# Patient Record
Sex: MALE | Race: WHITE | NOT HISPANIC OR LATINO | Employment: UNEMPLOYED | URBAN - METROPOLITAN AREA
[De-identification: names, ages, dates, MRNs, and addresses within clinical notes are randomized per-mention and may not be internally consistent; named-entity substitution may affect disease eponyms.]

---

## 2022-05-23 ENCOUNTER — HOSPITAL ENCOUNTER (INPATIENT)
Facility: HOSPITAL | Age: 57
LOS: 2 days | Discharge: HOME | End: 2022-05-27
Attending: EMERGENCY MEDICINE | Admitting: HOSPITALIST

## 2022-05-23 ENCOUNTER — APPOINTMENT (OUTPATIENT)
Dept: GENERAL RADIOLOGY | Facility: HOSPITAL | Age: 57
End: 2022-05-23

## 2022-05-23 DIAGNOSIS — L03.113 CELLULITIS OF RIGHT FOREARM: ICD-10-CM

## 2022-05-23 DIAGNOSIS — M71.121 SEPTIC BURSITIS OF ELBOW, RIGHT: Primary | ICD-10-CM

## 2022-05-23 PROBLEM — M25.421 EFFUSION OF RIGHT ELBOW: Status: ACTIVE | Noted: 2022-05-23

## 2022-05-23 LAB
ALBUMIN SERPL-MCNC: 3.4 G/DL (ref 3.5–5.2)
ALBUMIN/GLOB SERPL: 1.1 G/DL
ALP SERPL-CCNC: 77 U/L (ref 39–117)
ALT SERPL W P-5'-P-CCNC: 13 U/L (ref 1–41)
ANION GAP SERPL CALCULATED.3IONS-SCNC: 9 MMOL/L (ref 5–15)
AST SERPL-CCNC: 13 U/L (ref 1–40)
BASOPHILS # BLD AUTO: 0.08 10*3/MM3 (ref 0–0.2)
BASOPHILS NFR BLD AUTO: 0.7 % (ref 0–1.5)
BILIRUB SERPL-MCNC: 0.4 MG/DL (ref 0–1.2)
BUN SERPL-MCNC: 9 MG/DL (ref 6–20)
BUN/CREAT SERPL: 19.6 (ref 7–25)
CALCIUM SPEC-SCNC: 9.1 MG/DL (ref 8.6–10.5)
CHLORIDE SERPL-SCNC: 108 MMOL/L (ref 98–107)
CK SERPL-CCNC: 126 U/L (ref 20–200)
CO2 SERPL-SCNC: 23 MMOL/L (ref 22–29)
CREAT SERPL-MCNC: 0.46 MG/DL (ref 0.76–1.27)
CRP SERPL-MCNC: 2.79 MG/DL (ref 0–0.5)
D-LACTATE SERPL-SCNC: 0.8 MMOL/L (ref 0.5–2)
DEPRECATED RDW RBC AUTO: 43.8 FL (ref 37–54)
EGFRCR SERPLBLD CKD-EPI 2021: 122 ML/MIN/1.73
EOSINOPHIL # BLD AUTO: 0.04 10*3/MM3 (ref 0–0.4)
EOSINOPHIL NFR BLD AUTO: 0.3 % (ref 0.3–6.2)
ERYTHROCYTE [DISTWIDTH] IN BLOOD BY AUTOMATED COUNT: 13 % (ref 12.3–15.4)
ERYTHROCYTE [SEDIMENTATION RATE] IN BLOOD: 29 MM/HR (ref 0–20)
GLOBULIN UR ELPH-MCNC: 3.2 GM/DL
GLUCOSE SERPL-MCNC: 99 MG/DL (ref 65–99)
HCT VFR BLD AUTO: 43 % (ref 37.5–51)
HGB BLD-MCNC: 15.2 G/DL (ref 13–17.7)
HOLD SPECIMEN: NORMAL
HOLD SPECIMEN: NORMAL
IMM GRANULOCYTES # BLD AUTO: 0.08 10*3/MM3 (ref 0–0.05)
IMM GRANULOCYTES NFR BLD AUTO: 0.7 % (ref 0–0.5)
LYMPHOCYTES # BLD AUTO: 2.71 10*3/MM3 (ref 0.7–3.1)
LYMPHOCYTES NFR BLD AUTO: 23.6 % (ref 19.6–45.3)
MCH RBC QN AUTO: 33.3 PG (ref 26.6–33)
MCHC RBC AUTO-ENTMCNC: 35.3 G/DL (ref 31.5–35.7)
MCV RBC AUTO: 94.3 FL (ref 79–97)
MONOCYTES # BLD AUTO: 1.7 10*3/MM3 (ref 0.1–0.9)
MONOCYTES NFR BLD AUTO: 14.8 % (ref 5–12)
NEUTROPHILS NFR BLD AUTO: 59.9 % (ref 42.7–76)
NEUTROPHILS NFR BLD AUTO: 6.86 10*3/MM3 (ref 1.7–7)
NRBC BLD AUTO-RTO: 0 /100 WBC (ref 0–0.2)
PLATELET # BLD AUTO: 325 10*3/MM3 (ref 140–450)
PMV BLD AUTO: 10.4 FL (ref 6–12)
POTASSIUM SERPL-SCNC: 4.2 MMOL/L (ref 3.5–5.2)
PROT SERPL-MCNC: 6.6 G/DL (ref 6–8.5)
RBC # BLD AUTO: 4.56 10*6/MM3 (ref 4.14–5.8)
SARS-COV-2 RNA PNL SPEC NAA+PROBE: NOT DETECTED
SODIUM SERPL-SCNC: 140 MMOL/L (ref 136–145)
URATE SERPL-MCNC: 6.5 MG/DL (ref 3.4–7)
WBC NRBC COR # BLD: 11.47 10*3/MM3 (ref 3.4–10.8)
WHOLE BLOOD HOLD COAG: NORMAL
WHOLE BLOOD HOLD SPECIMEN: NORMAL

## 2022-05-23 PROCEDURE — 25010000002 VANCOMYCIN 10 G RECONSTITUTED SOLUTION: Performed by: EMERGENCY MEDICINE

## 2022-05-23 PROCEDURE — G0378 HOSPITAL OBSERVATION PER HR: HCPCS

## 2022-05-23 PROCEDURE — 73090 X-RAY EXAM OF FOREARM: CPT

## 2022-05-23 PROCEDURE — 25010000002 HYDROMORPHONE PER 4 MG: Performed by: EMERGENCY MEDICINE

## 2022-05-23 PROCEDURE — 80053 COMPREHEN METABOLIC PANEL: CPT | Performed by: EMERGENCY MEDICINE

## 2022-05-23 PROCEDURE — 85652 RBC SED RATE AUTOMATED: CPT | Performed by: EMERGENCY MEDICINE

## 2022-05-23 PROCEDURE — 86140 C-REACTIVE PROTEIN: CPT | Performed by: EMERGENCY MEDICINE

## 2022-05-23 PROCEDURE — 36415 COLL VENOUS BLD VENIPUNCTURE: CPT

## 2022-05-23 PROCEDURE — 83605 ASSAY OF LACTIC ACID: CPT | Performed by: EMERGENCY MEDICINE

## 2022-05-23 PROCEDURE — 25010000002 KETOROLAC TROMETHAMINE PER 15 MG: Performed by: EMERGENCY MEDICINE

## 2022-05-23 PROCEDURE — 87040 BLOOD CULTURE FOR BACTERIA: CPT | Performed by: EMERGENCY MEDICINE

## 2022-05-23 PROCEDURE — 84550 ASSAY OF BLOOD/URIC ACID: CPT | Performed by: HOSPITALIST

## 2022-05-23 PROCEDURE — 99284 EMERGENCY DEPT VISIT MOD MDM: CPT

## 2022-05-23 PROCEDURE — 82550 ASSAY OF CK (CPK): CPT | Performed by: EMERGENCY MEDICINE

## 2022-05-23 PROCEDURE — 25010000002 HYDROMORPHONE PER 4 MG: Performed by: HOSPITALIST

## 2022-05-23 PROCEDURE — 25010000002 MORPHINE PER 10 MG: Performed by: EMERGENCY MEDICINE

## 2022-05-23 PROCEDURE — 25010000002 METHYLPREDNISOLONE PER 125 MG: Performed by: EMERGENCY MEDICINE

## 2022-05-23 PROCEDURE — 85025 COMPLETE CBC W/AUTO DIFF WBC: CPT | Performed by: EMERGENCY MEDICINE

## 2022-05-23 PROCEDURE — 25010000002 CEFTRIAXONE PER 250 MG: Performed by: EMERGENCY MEDICINE

## 2022-05-23 PROCEDURE — 87635 SARS-COV-2 COVID-19 AMP PRB: CPT | Performed by: EMERGENCY MEDICINE

## 2022-05-23 RX ORDER — OMEGA-3 FATTY ACIDS/FISH OIL 300-1000MG
2 CAPSULE ORAL 4 TIMES DAILY PRN
COMMUNITY
End: 2022-05-27 | Stop reason: HOSPADM

## 2022-05-23 RX ORDER — NALOXONE HCL 0.4 MG/ML
0.4 VIAL (ML) INJECTION
Status: DISCONTINUED | OUTPATIENT
Start: 2022-05-23 | End: 2022-05-27 | Stop reason: HOSPADM

## 2022-05-23 RX ORDER — HYDROCODONE BITARTRATE AND ACETAMINOPHEN 7.5; 325 MG/1; MG/1
1 TABLET ORAL EVERY 4 HOURS PRN
Status: DISCONTINUED | OUTPATIENT
Start: 2022-05-23 | End: 2022-05-25

## 2022-05-23 RX ORDER — SODIUM CHLORIDE 0.9 % (FLUSH) 0.9 %
10 SYRINGE (ML) INJECTION EVERY 12 HOURS SCHEDULED
Status: DISCONTINUED | OUTPATIENT
Start: 2022-05-23 | End: 2022-05-27 | Stop reason: HOSPADM

## 2022-05-23 RX ORDER — HYDROMORPHONE HYDROCHLORIDE 1 MG/ML
0.5 INJECTION, SOLUTION INTRAMUSCULAR; INTRAVENOUS; SUBCUTANEOUS
Status: DISCONTINUED | OUTPATIENT
Start: 2022-05-23 | End: 2022-05-27 | Stop reason: HOSPADM

## 2022-05-23 RX ORDER — ACETAMINOPHEN 325 MG/1
650 TABLET ORAL EVERY 4 HOURS PRN
Status: DISCONTINUED | OUTPATIENT
Start: 2022-05-23 | End: 2022-05-27 | Stop reason: HOSPADM

## 2022-05-23 RX ORDER — KETOROLAC TROMETHAMINE 15 MG/ML
15 INJECTION, SOLUTION INTRAMUSCULAR; INTRAVENOUS ONCE
Status: COMPLETED | OUTPATIENT
Start: 2022-05-23 | End: 2022-05-23

## 2022-05-23 RX ORDER — MORPHINE SULFATE 2 MG/ML
4 INJECTION, SOLUTION INTRAMUSCULAR; INTRAVENOUS ONCE
Status: COMPLETED | OUTPATIENT
Start: 2022-05-23 | End: 2022-05-23

## 2022-05-23 RX ORDER — METHYLPREDNISOLONE SODIUM SUCCINATE 125 MG/2ML
125 INJECTION, POWDER, LYOPHILIZED, FOR SOLUTION INTRAMUSCULAR; INTRAVENOUS ONCE
Status: COMPLETED | OUTPATIENT
Start: 2022-05-23 | End: 2022-05-23

## 2022-05-23 RX ORDER — HYDROMORPHONE HYDROCHLORIDE 1 MG/ML
0.5 INJECTION, SOLUTION INTRAMUSCULAR; INTRAVENOUS; SUBCUTANEOUS ONCE
Status: COMPLETED | OUTPATIENT
Start: 2022-05-23 | End: 2022-05-23

## 2022-05-23 RX ORDER — SODIUM CHLORIDE 0.9 % (FLUSH) 0.9 %
10 SYRINGE (ML) INJECTION AS NEEDED
Status: DISCONTINUED | OUTPATIENT
Start: 2022-05-23 | End: 2022-05-27 | Stop reason: HOSPADM

## 2022-05-23 RX ADMIN — VANCOMYCIN HYDROCHLORIDE 1500 MG: 10 INJECTION, POWDER, LYOPHILIZED, FOR SOLUTION INTRAVENOUS at 22:36

## 2022-05-23 RX ADMIN — MORPHINE SULFATE 4 MG: 2 INJECTION, SOLUTION INTRAMUSCULAR; INTRAVENOUS at 17:04

## 2022-05-23 RX ADMIN — HYDROCODONE BITARTRATE AND ACETAMINOPHEN 1 TABLET: 7.5; 325 TABLET ORAL at 22:37

## 2022-05-23 RX ADMIN — KETOROLAC TROMETHAMINE 15 MG: 15 INJECTION, SOLUTION INTRAMUSCULAR; INTRAVENOUS at 18:19

## 2022-05-23 RX ADMIN — HYDROMORPHONE HYDROCHLORIDE 0.5 MG: 1 INJECTION, SOLUTION INTRAMUSCULAR; INTRAVENOUS; SUBCUTANEOUS at 23:53

## 2022-05-23 RX ADMIN — HYDROMORPHONE HYDROCHLORIDE 0.5 MG: 1 INJECTION, SOLUTION INTRAMUSCULAR; INTRAVENOUS; SUBCUTANEOUS at 20:44

## 2022-05-23 RX ADMIN — CEFTRIAXONE 1 G: 1 INJECTION, POWDER, FOR SOLUTION INTRAMUSCULAR; INTRAVENOUS at 17:04

## 2022-05-23 RX ADMIN — SODIUM CHLORIDE 500 ML: 9 INJECTION, SOLUTION INTRAVENOUS at 17:06

## 2022-05-23 RX ADMIN — METHYLPREDNISOLONE SODIUM SUCCINATE 125 MG: 125 INJECTION, POWDER, FOR SOLUTION INTRAMUSCULAR; INTRAVENOUS at 17:04

## 2022-05-23 RX ADMIN — Medication 10 ML: at 22:37

## 2022-05-24 LAB
ANION GAP SERPL CALCULATED.3IONS-SCNC: 8 MMOL/L (ref 5–15)
BUN SERPL-MCNC: 10 MG/DL (ref 6–20)
BUN/CREAT SERPL: 26.3 (ref 7–25)
CALCIUM SPEC-SCNC: 8.9 MG/DL (ref 8.6–10.5)
CHLORIDE SERPL-SCNC: 107 MMOL/L (ref 98–107)
CO2 SERPL-SCNC: 20 MMOL/L (ref 22–29)
CREAT SERPL-MCNC: 0.38 MG/DL (ref 0.76–1.27)
DEPRECATED RDW RBC AUTO: 42.8 FL (ref 37–54)
EGFRCR SERPLBLD CKD-EPI 2021: 129.2 ML/MIN/1.73
ERYTHROCYTE [DISTWIDTH] IN BLOOD BY AUTOMATED COUNT: 12 % (ref 12.3–15.4)
GLUCOSE SERPL-MCNC: 150 MG/DL (ref 65–99)
HCT VFR BLD AUTO: 42.5 % (ref 37.5–51)
HGB BLD-MCNC: 14.7 G/DL (ref 13–17.7)
MCH RBC QN AUTO: 33.4 PG (ref 26.6–33)
MCHC RBC AUTO-ENTMCNC: 34.6 G/DL (ref 31.5–35.7)
MCV RBC AUTO: 96.6 FL (ref 79–97)
PLATELET # BLD AUTO: 281 10*3/MM3 (ref 140–450)
PMV BLD AUTO: 9.9 FL (ref 6–12)
POTASSIUM SERPL-SCNC: 4.8 MMOL/L (ref 3.5–5.2)
RBC # BLD AUTO: 4.4 10*6/MM3 (ref 4.14–5.8)
SODIUM SERPL-SCNC: 135 MMOL/L (ref 136–145)
WBC NRBC COR # BLD: 8.23 10*3/MM3 (ref 3.4–10.8)

## 2022-05-24 PROCEDURE — G0378 HOSPITAL OBSERVATION PER HR: HCPCS

## 2022-05-24 PROCEDURE — 99204 OFFICE O/P NEW MOD 45 MIN: CPT | Performed by: STUDENT IN AN ORGANIZED HEALTH CARE EDUCATION/TRAINING PROGRAM

## 2022-05-24 PROCEDURE — 80048 BASIC METABOLIC PNL TOTAL CA: CPT | Performed by: HOSPITALIST

## 2022-05-24 PROCEDURE — 25010000002 VANCOMYCIN 10 G RECONSTITUTED SOLUTION: Performed by: NURSE PRACTITIONER

## 2022-05-24 PROCEDURE — 25010000002 HYDROMORPHONE PER 4 MG: Performed by: HOSPITALIST

## 2022-05-24 PROCEDURE — 85027 COMPLETE CBC AUTOMATED: CPT | Performed by: HOSPITALIST

## 2022-05-24 RX ADMIN — HYDROCODONE BITARTRATE AND ACETAMINOPHEN 1 TABLET: 7.5; 325 TABLET ORAL at 21:19

## 2022-05-24 RX ADMIN — HYDROCODONE BITARTRATE AND ACETAMINOPHEN 1 TABLET: 7.5; 325 TABLET ORAL at 17:05

## 2022-05-24 RX ADMIN — HYDROMORPHONE HYDROCHLORIDE 0.5 MG: 1 INJECTION, SOLUTION INTRAMUSCULAR; INTRAVENOUS; SUBCUTANEOUS at 02:47

## 2022-05-24 RX ADMIN — HYDROCODONE BITARTRATE AND ACETAMINOPHEN 1 TABLET: 7.5; 325 TABLET ORAL at 09:22

## 2022-05-24 RX ADMIN — VANCOMYCIN HYDROCHLORIDE 1250 MG: 10 INJECTION, POWDER, LYOPHILIZED, FOR SOLUTION INTRAVENOUS at 09:22

## 2022-05-24 RX ADMIN — VANCOMYCIN HYDROCHLORIDE 1250 MG: 10 INJECTION, POWDER, LYOPHILIZED, FOR SOLUTION INTRAVENOUS at 21:19

## 2022-05-24 RX ADMIN — HYDROMORPHONE HYDROCHLORIDE 0.5 MG: 1 INJECTION, SOLUTION INTRAMUSCULAR; INTRAVENOUS; SUBCUTANEOUS at 15:39

## 2022-05-24 RX ADMIN — Medication 10 ML: at 21:19

## 2022-05-24 RX ADMIN — Medication 10 ML: at 09:23

## 2022-05-24 RX ADMIN — HYDROMORPHONE HYDROCHLORIDE 0.5 MG: 1 INJECTION, SOLUTION INTRAMUSCULAR; INTRAVENOUS; SUBCUTANEOUS at 06:34

## 2022-05-24 RX ADMIN — HYDROMORPHONE HYDROCHLORIDE 0.5 MG: 1 INJECTION, SOLUTION INTRAMUSCULAR; INTRAVENOUS; SUBCUTANEOUS at 09:22

## 2022-05-25 ENCOUNTER — APPOINTMENT (OUTPATIENT)
Dept: MRI IMAGING | Facility: HOSPITAL | Age: 57
End: 2022-05-25

## 2022-05-25 LAB — VANCOMYCIN TROUGH SERPL-MCNC: 6.2 MCG/ML (ref 5–20)

## 2022-05-25 PROCEDURE — 99232 SBSQ HOSP IP/OBS MODERATE 35: CPT | Performed by: STUDENT IN AN ORGANIZED HEALTH CARE EDUCATION/TRAINING PROGRAM

## 2022-05-25 PROCEDURE — 80202 ASSAY OF VANCOMYCIN: CPT | Performed by: NURSE PRACTITIONER

## 2022-05-25 PROCEDURE — 25010000002 VANCOMYCIN 10 G RECONSTITUTED SOLUTION: Performed by: NURSE PRACTITIONER

## 2022-05-25 PROCEDURE — 25010000002 VANCOMYCIN 10 G RECONSTITUTED SOLUTION: Performed by: STUDENT IN AN ORGANIZED HEALTH CARE EDUCATION/TRAINING PROGRAM

## 2022-05-25 RX ORDER — HYDROCODONE BITARTRATE AND ACETAMINOPHEN 10; 325 MG/1; MG/1
1 TABLET ORAL EVERY 6 HOURS PRN
Status: DISCONTINUED | OUTPATIENT
Start: 2022-05-25 | End: 2022-05-25

## 2022-05-25 RX ORDER — HYDROCODONE BITARTRATE AND ACETAMINOPHEN 10; 325 MG/1; MG/1
1 TABLET ORAL EVERY 4 HOURS PRN
Status: DISCONTINUED | OUTPATIENT
Start: 2022-05-25 | End: 2022-05-27 | Stop reason: HOSPADM

## 2022-05-25 RX ADMIN — HYDROCODONE BITARTRATE AND ACETAMINOPHEN 1 TABLET: 7.5; 325 TABLET ORAL at 08:24

## 2022-05-25 RX ADMIN — HYDROCODONE BITARTRATE AND ACETAMINOPHEN 1 TABLET: 10; 325 TABLET ORAL at 18:16

## 2022-05-25 RX ADMIN — HYDROCODONE BITARTRATE AND ACETAMINOPHEN 1 TABLET: 7.5; 325 TABLET ORAL at 14:14

## 2022-05-25 RX ADMIN — HYDROCODONE BITARTRATE AND ACETAMINOPHEN 1 TABLET: 7.5; 325 TABLET ORAL at 04:25

## 2022-05-25 RX ADMIN — VANCOMYCIN HYDROCHLORIDE 1500 MG: 10 INJECTION, POWDER, LYOPHILIZED, FOR SOLUTION INTRAVENOUS at 22:33

## 2022-05-25 RX ADMIN — Medication 10 ML: at 08:24

## 2022-05-25 RX ADMIN — VANCOMYCIN HYDROCHLORIDE 1250 MG: 10 INJECTION, POWDER, LYOPHILIZED, FOR SOLUTION INTRAVENOUS at 11:46

## 2022-05-25 RX ADMIN — HYDROCODONE BITARTRATE AND ACETAMINOPHEN 1 TABLET: 10; 325 TABLET ORAL at 22:32

## 2022-05-25 RX ADMIN — Medication 10 ML: at 22:37

## 2022-05-26 ENCOUNTER — APPOINTMENT (OUTPATIENT)
Dept: MRI IMAGING | Facility: HOSPITAL | Age: 57
End: 2022-05-26

## 2022-05-26 LAB
ANION GAP SERPL CALCULATED.3IONS-SCNC: 10.3 MMOL/L (ref 5–15)
BUN SERPL-MCNC: 9 MG/DL (ref 6–20)
BUN/CREAT SERPL: 18 (ref 7–25)
CALCIUM SPEC-SCNC: 9.6 MG/DL (ref 8.6–10.5)
CHLORIDE SERPL-SCNC: 105 MMOL/L (ref 98–107)
CO2 SERPL-SCNC: 22.7 MMOL/L (ref 22–29)
CREAT SERPL-MCNC: 0.5 MG/DL (ref 0.76–1.27)
EGFRCR SERPLBLD CKD-EPI 2021: 119 ML/MIN/1.73
GLUCOSE SERPL-MCNC: 139 MG/DL (ref 65–99)
POTASSIUM SERPL-SCNC: 4 MMOL/L (ref 3.5–5.2)
SODIUM SERPL-SCNC: 138 MMOL/L (ref 136–145)

## 2022-05-26 PROCEDURE — 25010000002 LORAZEPAM PER 2 MG: Performed by: STUDENT IN AN ORGANIZED HEALTH CARE EDUCATION/TRAINING PROGRAM

## 2022-05-26 PROCEDURE — 80048 BASIC METABOLIC PNL TOTAL CA: CPT | Performed by: NURSE PRACTITIONER

## 2022-05-26 PROCEDURE — 25010000002 HYDROMORPHONE PER 4 MG: Performed by: HOSPITALIST

## 2022-05-26 PROCEDURE — 73223 MRI JOINT UPR EXTR W/O&W/DYE: CPT

## 2022-05-26 PROCEDURE — 25010000002 VANCOMYCIN 10 G RECONSTITUTED SOLUTION: Performed by: STUDENT IN AN ORGANIZED HEALTH CARE EDUCATION/TRAINING PROGRAM

## 2022-05-26 PROCEDURE — 99232 SBSQ HOSP IP/OBS MODERATE 35: CPT | Performed by: STUDENT IN AN ORGANIZED HEALTH CARE EDUCATION/TRAINING PROGRAM

## 2022-05-26 PROCEDURE — 0 GADOBENATE DIMEGLUMINE 529 MG/ML SOLUTION: Performed by: STUDENT IN AN ORGANIZED HEALTH CARE EDUCATION/TRAINING PROGRAM

## 2022-05-26 PROCEDURE — A9577 INJ MULTIHANCE: HCPCS | Performed by: STUDENT IN AN ORGANIZED HEALTH CARE EDUCATION/TRAINING PROGRAM

## 2022-05-26 RX ORDER — LORAZEPAM 2 MG/ML
0.5 INJECTION INTRAMUSCULAR ONCE
Status: COMPLETED | OUTPATIENT
Start: 2022-05-26 | End: 2022-05-26

## 2022-05-26 RX ADMIN — VANCOMYCIN HYDROCHLORIDE 1500 MG: 10 INJECTION, POWDER, LYOPHILIZED, FOR SOLUTION INTRAVENOUS at 22:21

## 2022-05-26 RX ADMIN — Medication 10 ML: at 09:08

## 2022-05-26 RX ADMIN — HYDROMORPHONE HYDROCHLORIDE 0.5 MG: 1 INJECTION, SOLUTION INTRAMUSCULAR; INTRAVENOUS; SUBCUTANEOUS at 09:07

## 2022-05-26 RX ADMIN — LORAZEPAM 0.5 MG: 2 INJECTION INTRAMUSCULAR; INTRAVENOUS at 09:19

## 2022-05-26 RX ADMIN — VANCOMYCIN HYDROCHLORIDE 1500 MG: 10 INJECTION, POWDER, LYOPHILIZED, FOR SOLUTION INTRAVENOUS at 11:21

## 2022-05-26 RX ADMIN — HYDROCODONE BITARTRATE AND ACETAMINOPHEN 1 TABLET: 10; 325 TABLET ORAL at 09:07

## 2022-05-26 RX ADMIN — GADOBENATE DIMEGLUMINE 16 ML: 529 INJECTION, SOLUTION INTRAVENOUS at 10:26

## 2022-05-26 RX ADMIN — HYDROCODONE BITARTRATE AND ACETAMINOPHEN 1 TABLET: 10; 325 TABLET ORAL at 22:22

## 2022-05-27 VITALS
SYSTOLIC BLOOD PRESSURE: 162 MMHG | HEIGHT: 69 IN | DIASTOLIC BLOOD PRESSURE: 90 MMHG | TEMPERATURE: 97.1 F | WEIGHT: 172 LBS | BODY MASS INDEX: 25.48 KG/M2 | HEART RATE: 56 BPM | OXYGEN SATURATION: 94 % | RESPIRATION RATE: 18 BRPM

## 2022-05-27 RX ORDER — HYDROCODONE BITARTRATE AND ACETAMINOPHEN 10; 325 MG/1; MG/1
1 TABLET ORAL EVERY 6 HOURS PRN
Qty: 12 TABLET | Refills: 0 | Status: SHIPPED | OUTPATIENT
Start: 2022-05-27 | End: 2022-05-30

## 2022-05-27 RX ORDER — SULFAMETHOXAZOLE AND TRIMETHOPRIM 800; 160 MG/1; MG/1
1 TABLET ORAL 2 TIMES DAILY
Qty: 20 TABLET | Refills: 0 | Status: SHIPPED | OUTPATIENT
Start: 2022-05-27 | End: 2022-06-06

## 2022-05-27 RX ADMIN — HYDROCODONE BITARTRATE AND ACETAMINOPHEN 1 TABLET: 10; 325 TABLET ORAL at 08:49

## 2022-05-28 LAB
BACTERIA SPEC AEROBE CULT: NORMAL
BACTERIA SPEC AEROBE CULT: NORMAL

## 2022-08-16 ENCOUNTER — HOSPITAL ENCOUNTER (OUTPATIENT)
Facility: HOSPITAL | Age: 57
Setting detail: OBSERVATION
Discharge: HOME | End: 2022-08-19
Attending: EMERGENCY MEDICINE | Admitting: EMERGENCY MEDICINE

## 2022-08-16 ENCOUNTER — APPOINTMENT (OUTPATIENT)
Dept: CT IMAGING | Facility: HOSPITAL | Age: 57
End: 2022-08-16

## 2022-08-16 DIAGNOSIS — K52.9 ENTERITIS: Primary | ICD-10-CM

## 2022-08-16 DIAGNOSIS — D72.829 LEUKOCYTOSIS, UNSPECIFIED TYPE: ICD-10-CM

## 2022-08-16 LAB
ALBUMIN SERPL-MCNC: 4 G/DL (ref 3.5–5.2)
ALBUMIN/GLOB SERPL: 1.4 G/DL
ALP SERPL-CCNC: 82 U/L (ref 39–117)
ALT SERPL W P-5'-P-CCNC: 15 U/L (ref 1–41)
ANION GAP SERPL CALCULATED.3IONS-SCNC: 10.2 MMOL/L (ref 5–15)
AST SERPL-CCNC: 18 U/L (ref 1–40)
BASOPHILS # BLD AUTO: 0.04 10*3/MM3 (ref 0–0.2)
BASOPHILS NFR BLD AUTO: 0.3 % (ref 0–1.5)
BILIRUB SERPL-MCNC: 0.8 MG/DL (ref 0–1.2)
BILIRUB UR QL STRIP: NEGATIVE
BUN SERPL-MCNC: 12 MG/DL (ref 6–20)
BUN/CREAT SERPL: 14.6 (ref 7–25)
CALCIUM SPEC-SCNC: 9.5 MG/DL (ref 8.6–10.5)
CHLORIDE SERPL-SCNC: 98 MMOL/L (ref 98–107)
CLARITY UR: CLEAR
CO2 SERPL-SCNC: 21.8 MMOL/L (ref 22–29)
COLOR UR: ABNORMAL
CREAT SERPL-MCNC: 0.82 MG/DL (ref 0.76–1.27)
DEPRECATED RDW RBC AUTO: 44.2 FL (ref 37–54)
EGFRCR SERPLBLD CKD-EPI 2021: 101.8 ML/MIN/1.73
EOSINOPHIL # BLD AUTO: 0.04 10*3/MM3 (ref 0–0.4)
EOSINOPHIL NFR BLD AUTO: 0.3 % (ref 0.3–6.2)
ERYTHROCYTE [DISTWIDTH] IN BLOOD BY AUTOMATED COUNT: 12.8 % (ref 12.3–15.4)
GLOBULIN UR ELPH-MCNC: 2.9 GM/DL
GLUCOSE SERPL-MCNC: 104 MG/DL (ref 65–99)
GLUCOSE UR STRIP-MCNC: NEGATIVE MG/DL
HCT VFR BLD AUTO: 50.2 % (ref 37.5–51)
HGB BLD-MCNC: 18 G/DL (ref 13–17.7)
HGB UR QL STRIP.AUTO: NEGATIVE
HOLD SPECIMEN: NORMAL
HOLD SPECIMEN: NORMAL
IMM GRANULOCYTES # BLD AUTO: 0.15 10*3/MM3 (ref 0–0.05)
IMM GRANULOCYTES NFR BLD AUTO: 1.1 % (ref 0–0.5)
KETONES UR QL STRIP: ABNORMAL
LEUKOCYTE ESTERASE UR QL STRIP.AUTO: NEGATIVE
LIPASE SERPL-CCNC: 30 U/L (ref 13–60)
LYMPHOCYTES # BLD AUTO: 2.18 10*3/MM3 (ref 0.7–3.1)
LYMPHOCYTES NFR BLD AUTO: 15.5 % (ref 19.6–45.3)
MCH RBC QN AUTO: 34.1 PG (ref 26.6–33)
MCHC RBC AUTO-ENTMCNC: 35.9 G/DL (ref 31.5–35.7)
MCV RBC AUTO: 95.1 FL (ref 79–97)
MONOCYTES # BLD AUTO: 1.84 10*3/MM3 (ref 0.1–0.9)
MONOCYTES NFR BLD AUTO: 13 % (ref 5–12)
NEUTROPHILS NFR BLD AUTO: 69.8 % (ref 42.7–76)
NEUTROPHILS NFR BLD AUTO: 9.86 10*3/MM3 (ref 1.7–7)
NITRITE UR QL STRIP: NEGATIVE
NRBC BLD AUTO-RTO: 0 /100 WBC (ref 0–0.2)
PH UR STRIP.AUTO: 5.5 [PH] (ref 5–8)
PLATELET # BLD AUTO: 349 10*3/MM3 (ref 140–450)
PMV BLD AUTO: 10.1 FL (ref 6–12)
POTASSIUM SERPL-SCNC: 4.4 MMOL/L (ref 3.5–5.2)
PROT SERPL-MCNC: 6.9 G/DL (ref 6–8.5)
PROT UR QL STRIP: ABNORMAL
RBC # BLD AUTO: 5.28 10*6/MM3 (ref 4.14–5.8)
SARS-COV-2 RNA RESP QL NAA+PROBE: NOT DETECTED
SODIUM SERPL-SCNC: 130 MMOL/L (ref 136–145)
SP GR UR STRIP: >=1.03 (ref 1–1.03)
UROBILINOGEN UR QL STRIP: ABNORMAL
WBC NRBC COR # BLD: 14.11 10*3/MM3 (ref 3.4–10.8)
WHOLE BLOOD HOLD COAG: NORMAL
WHOLE BLOOD HOLD SPECIMEN: NORMAL

## 2022-08-16 PROCEDURE — 25010000002 IOPAMIDOL 61 % SOLUTION: Performed by: EMERGENCY MEDICINE

## 2022-08-16 PROCEDURE — 25010000002 MORPHINE PER 10 MG: Performed by: EMERGENCY MEDICINE

## 2022-08-16 PROCEDURE — 80053 COMPREHEN METABOLIC PANEL: CPT | Performed by: EMERGENCY MEDICINE

## 2022-08-16 PROCEDURE — G0378 HOSPITAL OBSERVATION PER HR: HCPCS

## 2022-08-16 PROCEDURE — 82746 ASSAY OF FOLIC ACID SERUM: CPT | Performed by: NURSE PRACTITIONER

## 2022-08-16 PROCEDURE — 74177 CT ABD & PELVIS W/CONTRAST: CPT

## 2022-08-16 PROCEDURE — 93005 ELECTROCARDIOGRAM TRACING: CPT | Performed by: EMERGENCY MEDICINE

## 2022-08-16 PROCEDURE — 63710000001 ONDANSETRON PER 8 MG: Performed by: STUDENT IN AN ORGANIZED HEALTH CARE EDUCATION/TRAINING PROGRAM

## 2022-08-16 PROCEDURE — 96375 TX/PRO/DX INJ NEW DRUG ADDON: CPT

## 2022-08-16 PROCEDURE — 25010000002 ONDANSETRON PER 1 MG: Performed by: EMERGENCY MEDICINE

## 2022-08-16 PROCEDURE — 85025 COMPLETE CBC W/AUTO DIFF WBC: CPT | Performed by: EMERGENCY MEDICINE

## 2022-08-16 PROCEDURE — 82607 VITAMIN B-12: CPT | Performed by: NURSE PRACTITIONER

## 2022-08-16 PROCEDURE — 84484 ASSAY OF TROPONIN QUANT: CPT | Performed by: EMERGENCY MEDICINE

## 2022-08-16 PROCEDURE — C9803 HOPD COVID-19 SPEC COLLECT: HCPCS

## 2022-08-16 PROCEDURE — U0003 INFECTIOUS AGENT DETECTION BY NUCLEIC ACID (DNA OR RNA); SEVERE ACUTE RESPIRATORY SYNDROME CORONAVIRUS 2 (SARS-COV-2) (CORONAVIRUS DISEASE [COVID-19]), AMPLIFIED PROBE TECHNIQUE, MAKING USE OF HIGH THROUGHPUT TECHNOLOGIES AS DESCRIBED BY CMS-2020-01-R: HCPCS | Performed by: PHYSICIAN ASSISTANT

## 2022-08-16 PROCEDURE — 83605 ASSAY OF LACTIC ACID: CPT | Performed by: EMERGENCY MEDICINE

## 2022-08-16 PROCEDURE — 83690 ASSAY OF LIPASE: CPT | Performed by: EMERGENCY MEDICINE

## 2022-08-16 PROCEDURE — 81003 URINALYSIS AUTO W/O SCOPE: CPT | Performed by: EMERGENCY MEDICINE

## 2022-08-16 PROCEDURE — 93010 ELECTROCARDIOGRAM REPORT: CPT | Performed by: INTERNAL MEDICINE

## 2022-08-16 PROCEDURE — 25010000002 HYDROMORPHONE PER 4 MG: Performed by: EMERGENCY MEDICINE

## 2022-08-16 PROCEDURE — 99285 EMERGENCY DEPT VISIT HI MDM: CPT

## 2022-08-16 RX ORDER — ACETAMINOPHEN 325 MG/1
650 TABLET ORAL EVERY 4 HOURS PRN
Status: DISCONTINUED | OUTPATIENT
Start: 2022-08-16 | End: 2022-08-19 | Stop reason: HOSPADM

## 2022-08-16 RX ORDER — ONDANSETRON 2 MG/ML
4 INJECTION INTRAMUSCULAR; INTRAVENOUS ONCE
Status: COMPLETED | OUTPATIENT
Start: 2022-08-16 | End: 2022-08-16

## 2022-08-16 RX ORDER — SODIUM CHLORIDE 0.9 % (FLUSH) 0.9 %
10 SYRINGE (ML) INJECTION AS NEEDED
Status: DISCONTINUED | OUTPATIENT
Start: 2022-08-16 | End: 2022-08-19 | Stop reason: HOSPADM

## 2022-08-16 RX ORDER — MORPHINE SULFATE 2 MG/ML
4 INJECTION, SOLUTION INTRAMUSCULAR; INTRAVENOUS ONCE
Status: COMPLETED | OUTPATIENT
Start: 2022-08-16 | End: 2022-08-16

## 2022-08-16 RX ORDER — SODIUM CHLORIDE 0.9 % (FLUSH) 0.9 %
10 SYRINGE (ML) INJECTION EVERY 12 HOURS SCHEDULED
Status: DISCONTINUED | OUTPATIENT
Start: 2022-08-16 | End: 2022-08-19 | Stop reason: HOSPADM

## 2022-08-16 RX ORDER — NITROGLYCERIN 0.4 MG/1
0.4 TABLET SUBLINGUAL
Status: DISCONTINUED | OUTPATIENT
Start: 2022-08-16 | End: 2022-08-19 | Stop reason: HOSPADM

## 2022-08-16 RX ORDER — CHOLECALCIFEROL (VITAMIN D3) 125 MCG
5 CAPSULE ORAL NIGHTLY PRN
Status: DISCONTINUED | OUTPATIENT
Start: 2022-08-16 | End: 2022-08-19 | Stop reason: HOSPADM

## 2022-08-16 RX ORDER — HYDROMORPHONE HYDROCHLORIDE 1 MG/ML
0.5 INJECTION, SOLUTION INTRAMUSCULAR; INTRAVENOUS; SUBCUTANEOUS
Status: DISCONTINUED | OUTPATIENT
Start: 2022-08-16 | End: 2022-08-17

## 2022-08-16 RX ORDER — ONDANSETRON 4 MG/1
4 TABLET, FILM COATED ORAL EVERY 6 HOURS PRN
Status: DISCONTINUED | OUTPATIENT
Start: 2022-08-16 | End: 2022-08-19 | Stop reason: HOSPADM

## 2022-08-16 RX ORDER — SODIUM CHLORIDE 9 MG/ML
75 INJECTION, SOLUTION INTRAVENOUS CONTINUOUS
Status: DISCONTINUED | OUTPATIENT
Start: 2022-08-17 | End: 2022-08-19

## 2022-08-16 RX ORDER — ONDANSETRON 2 MG/ML
4 INJECTION INTRAMUSCULAR; INTRAVENOUS EVERY 6 HOURS PRN
Status: DISCONTINUED | OUTPATIENT
Start: 2022-08-16 | End: 2022-08-19 | Stop reason: HOSPADM

## 2022-08-16 RX ADMIN — HYDROMORPHONE HYDROCHLORIDE 0.5 MG: 1 INJECTION, SOLUTION INTRAMUSCULAR; INTRAVENOUS; SUBCUTANEOUS at 23:43

## 2022-08-16 RX ADMIN — ONDANSETRON 4 MG: 2 INJECTION INTRAMUSCULAR; INTRAVENOUS at 13:06

## 2022-08-16 RX ADMIN — SODIUM CHLORIDE 175 ML/HR: 9 INJECTION, SOLUTION INTRAVENOUS at 23:39

## 2022-08-16 RX ADMIN — METOPROLOL TARTRATE 5 MG: 1 INJECTION, SOLUTION INTRAVENOUS at 23:40

## 2022-08-16 RX ADMIN — ONDANSETRON HYDROCHLORIDE 4 MG: 4 TABLET, FILM COATED ORAL at 19:30

## 2022-08-16 RX ADMIN — SODIUM CHLORIDE 1000 ML: 9 INJECTION, SOLUTION INTRAVENOUS at 13:06

## 2022-08-16 RX ADMIN — IOPAMIDOL 85 ML: 612 INJECTION, SOLUTION INTRAVENOUS at 12:57

## 2022-08-16 RX ADMIN — Medication 10 ML: at 21:00

## 2022-08-16 RX ADMIN — MORPHINE SULFATE 4 MG: 2 INJECTION, SOLUTION INTRAMUSCULAR; INTRAVENOUS at 13:06

## 2022-08-16 RX ADMIN — SODIUM CHLORIDE 1000 ML: 9 INJECTION, SOLUTION INTRAVENOUS at 14:43

## 2022-08-16 NOTE — ED PROVIDER NOTES
EMERGENCY DEPARTMENT ENCOUNTER    Room Number:  28/28  Date of encounter:  8/16/2022  PCP: Provider, No Known  Historian: Patient      I used full protective equipment while examining this patient.  This includes face mask, gloves and protective eyewear.  I washed my hands before entering the room and immediately upon leaving the room      HPI:  Chief Complaint: Abdominal pain, vomiting, diarrhea  A complete HPI/ROS/PMH/PSH/SH/FH are unobtainable due to: Nothing    Context: Robert Hume is a 57 y.o. male who presents to the ED c/o 4-day history of sudden onset, constant abdominal pain, vomiting, diarrhea.  Patient states he has not been able to keep any food or fluids down secondary to the vomiting.  He has had intermittent diarrhea.  He describes his abdominal pain as crampy, diffuse, moderate in nature.  He denies any precipitating or alleviating symptoms.  He denies any prior similar episodes.  He denies any ill contacts.  He denies any recent antibiotics or travel.  He takes no medications and denies any prior abdominal surgeries.    Review of Medical Records  I reviewed patient's last admission from 5/23/2022.  Patient seen for septic bursitis of the right elbow.    PAST MEDICAL HISTORY  Active Ambulatory Problems     Diagnosis Date Noted   • Septic bursitis of elbow, right 05/23/2022   • Effusion of right elbow 05/23/2022   • Cellulitis of right elbow 05/23/2022     Resolved Ambulatory Problems     Diagnosis Date Noted   • No Resolved Ambulatory Problems     No Additional Past Medical History         PAST SURGICAL HISTORY  No past surgical history on file.      FAMILY HISTORY  No family history on file.      SOCIAL HISTORY  Social History     Socioeconomic History   • Marital status: Single   Tobacco Use   • Smoking status: Current Every Day Smoker     Packs/day: 1.00     Types: Cigarettes         ALLERGIES  Penicillins        REVIEW OF SYSTEMS  All systems reviewed and negative except for those discussed in  HPI.       PHYSICAL EXAM    I have reviewed the triage vital signs and nursing notes.    ED Triage Vitals   Temp Heart Rate Resp BP SpO2   08/16/22 1126 08/16/22 1126 08/16/22 1126 08/16/22 1135 08/16/22 1126   97.3 °F (36.3 °C) (!) 123 16 (!) 155/105 96 %      Temp src Heart Rate Source Patient Position BP Location FiO2 (%)   08/16/22 1126 08/16/22 1126 -- -- --   Tympanic Monitor          Physical Exam  GENERAL: Alert, oriented, ill-appearing, not distressed  HENT: head atraumatic, no nuchal rigidity  EYES: no scleral icterus, EOMI  CV: regular rhythm, regular rate, no murmur  RESPIRATORY: normal effort, CTA  ABDOMEN: soft, moderate diffuse tenderness without guarding or rebound.  Normal bowel sounds  MUSCULOSKELETAL: no deformity, FROM, no calf swelling or tenderness  NEURO: alert, moves all extremities, follows commands  SKIN: warm, dry        LAB RESULTS  Recent Results (from the past 24 hour(s))   Green Top (Gel)    Collection Time: 08/16/22 11:42 AM   Result Value Ref Range    Extra Tube Hold for add-ons.    Lavender Top    Collection Time: 08/16/22 11:42 AM   Result Value Ref Range    Extra Tube hold for add-on    Gold Top - SST    Collection Time: 08/16/22 11:42 AM   Result Value Ref Range    Extra Tube Hold for add-ons.    Light Blue Top    Collection Time: 08/16/22 11:42 AM   Result Value Ref Range    Extra Tube Hold for add-ons.    Comprehensive Metabolic Panel    Collection Time: 08/16/22 11:42 AM    Specimen: Blood   Result Value Ref Range    Glucose 104 (H) 65 - 99 mg/dL    BUN 12 6 - 20 mg/dL    Creatinine 0.82 0.76 - 1.27 mg/dL    Sodium 130 (L) 136 - 145 mmol/L    Potassium 4.4 3.5 - 5.2 mmol/L    Chloride 98 98 - 107 mmol/L    CO2 21.8 (L) 22.0 - 29.0 mmol/L    Calcium 9.5 8.6 - 10.5 mg/dL    Total Protein 6.9 6.0 - 8.5 g/dL    Albumin 4.00 3.50 - 5.20 g/dL    ALT (SGPT) 15 1 - 41 U/L    AST (SGOT) 18 1 - 40 U/L    Alkaline Phosphatase 82 39 - 117 U/L    Total Bilirubin 0.8 0.0 - 1.2 mg/dL     Globulin 2.9 gm/dL    A/G Ratio 1.4 g/dL    BUN/Creatinine Ratio 14.6 7.0 - 25.0    Anion Gap 10.2 5.0 - 15.0 mmol/L    eGFR 101.8 >60.0 mL/min/1.73   Lipase    Collection Time: 08/16/22 11:42 AM    Specimen: Blood   Result Value Ref Range    Lipase 30 13 - 60 U/L   CBC Auto Differential    Collection Time: 08/16/22 11:42 AM    Specimen: Blood   Result Value Ref Range    WBC 14.11 (H) 3.40 - 10.80 10*3/mm3    RBC 5.28 4.14 - 5.80 10*6/mm3    Hemoglobin 18.0 (H) 13.0 - 17.7 g/dL    Hematocrit 50.2 37.5 - 51.0 %    MCV 95.1 79.0 - 97.0 fL    MCH 34.1 (H) 26.6 - 33.0 pg    MCHC 35.9 (H) 31.5 - 35.7 g/dL    RDW 12.8 12.3 - 15.4 %    RDW-SD 44.2 37.0 - 54.0 fl    MPV 10.1 6.0 - 12.0 fL    Platelets 349 140 - 450 10*3/mm3    Neutrophil % 69.8 42.7 - 76.0 %    Lymphocyte % 15.5 (L) 19.6 - 45.3 %    Monocyte % 13.0 (H) 5.0 - 12.0 %    Eosinophil % 0.3 0.3 - 6.2 %    Basophil % 0.3 0.0 - 1.5 %    Immature Grans % 1.1 (H) 0.0 - 0.5 %    Neutrophils, Absolute 9.86 (H) 1.70 - 7.00 10*3/mm3    Lymphocytes, Absolute 2.18 0.70 - 3.10 10*3/mm3    Monocytes, Absolute 1.84 (H) 0.10 - 0.90 10*3/mm3    Eosinophils, Absolute 0.04 0.00 - 0.40 10*3/mm3    Basophils, Absolute 0.04 0.00 - 0.20 10*3/mm3    Immature Grans, Absolute 0.15 (H) 0.00 - 0.05 10*3/mm3    nRBC 0.0 0.0 - 0.2 /100 WBC   Urinalysis With Microscopic If Indicated (No Culture) - Urine, Clean Catch    Collection Time: 08/16/22  2:41 PM    Specimen: Urine, Clean Catch   Result Value Ref Range    Color, UA Dark Yellow (A) Yellow, Straw    Appearance, UA Clear Clear    pH, UA 5.5 5.0 - 8.0    Specific Gravity, UA >=1.030 1.005 - 1.030    Glucose, UA Negative Negative    Ketones, UA 15 mg/dL (1+) (A) Negative    Bilirubin, UA Negative Negative    Blood, UA Negative Negative    Protein, UA Trace (A) Negative    Leuk Esterase, UA Negative Negative    Nitrite, UA Negative Negative    Urobilinogen, UA 1.0 E.U./dL 0.2 - 1.0 E.U./dL   COVID-19,[facility] IN-HOUSE CEPHEID/KERI NP SWAB IN  TRANSPORT MEDIA 8-12 HR TAT - Swab, Nasopharynx    Collection Time: 08/16/22  2:42 PM    Specimen: Nasopharynx; Swab   Result Value Ref Range    COVID19 Not Detected Not Detected - Ref. Range       Ordered the above labs and independently reviewed the results.        RADIOLOGY  CT Abdomen Pelvis With Contrast    Result Date: 8/16/2022  CT ABDOMEN AND PELVIS WITH IV CONTRAST  HISTORY: Nausea, vomiting, diarrhea.  TECHNIQUE: Radiation dose reduction techniques were utilized, including automated exposure control and exposure modulation based on body size. 3 mm images were obtained through the abdomen and pelvis after the administration of IV contrast.  COMPARISON: None  FINDINGS: Lower chest: Patchy ground-glass opacities in the lung bases, right greater than left favoring atelectasis and/or scarring, please correlate clinically and follow-up as indicated.  The heart size is within normal limits.  Liver: Focal fat infiltration. Mild lobular contour. Too small to characterize sub-5 mm hypodensity in the right hepatic lobe.  Biliary tract: Within normal limits.  Spleen: Calcified granulomas.  Pancreas: Within normal limits.  Adrenals: Within normal limits.  Kidneys:  Within normal limits.  Bowel:  The stomach is incompletely distended. There is a long segment of bowel wall thickening and mucosal hyperenhancement involving the mid to distal jejunum to the terminal ileum. The appendix is within normal limits. The colon is incompletely distended. No free air or obvious pneumatosis. Small volume ascites.  Vasculature:    Extensive atherosclerosis abdominal aorta and branch vessels. The study is not optimized for vascular evaluation however, normal enhancement is present centrally.  Lymph Nodes:  Within normal limits.                                                        Pelvic organs: The bladder is incompletely distended. Prostate in situ.  Abdominal/Pelvic Wall: Within normal limits.  Bones: Multilevel degenerative  changes thoracolumbar spine. Arthropathic changes hips, right greater than left.      . IMPRESSION: 1.  Moderate-to-severe enteritis extending from the mid jejunum to the terminal ileum (likely infectious or inflammatory) with small volume ascites. Recommend clinical correlation and attention on short-term follow-up after treatment. 2.   Extensive atherosclerosis thoracoabdominal aorta and branch vessels. 3.  Bibasilar airspace disease favoring atelectasis and/or scar which can be followed on subsequent surveillance imaging. 4.  Please see above for additional findings.  I discussed these findings with Dr. Adrienne Nguyen at 1:24 PM on 08/16/2022.    This report was finalized on 8/16/2022 2:24 PM by Dr. Juan R Monson M.D.        I ordered the above noted radiological studies. Reviewed by me and discussed with radiologist.  See dictation for official radiology interpretation.      MEDICATIONS GIVEN IN ER    Medications   sodium chloride 0.9 % flush 10 mL (has no administration in time range)   sodium chloride 0.9 % flush 10 mL (has no administration in time range)   sodium chloride 0.9 % bolus 1,000 mL (0 mL Intravenous Stopped 8/16/22 1441)   ondansetron (ZOFRAN) injection 4 mg (4 mg Intravenous Given 8/16/22 1306)   morphine injection 4 mg (4 mg Intravenous Given 8/16/22 1306)   iopamidol (ISOVUE-300) 61 % injection 100 mL (85 mL Intravenous Given 8/16/22 1257)   sodium chloride 0.9 % bolus 1,000 mL (1,000 mL Intravenous New Bag 8/16/22 1443)         PROGRESS, DATA ANALYSIS, CONSULTS, AND MEDICAL DECISION MAKING    All labs have been independently reviewed by me.  All radiology studies have been reviewed by me and discussed with radiologist dictating the report.   EKG's independently viewed and interpreted by me.  Discussion below represents my analysis of pertinent findings related to patient's condition, differential diagnosis, treatment plan and final disposition.    I have discussed case with Dr. Nguyen, emergency room  physician.  She has performed her own bedside examination and agrees with treatment plan.    ED Course as of 08/16/22 1553   Tue Aug 16, 2022   1223 Patient presents with 4-day history of vomiting, abdominal pain, diarrhea.  Differential diagnoses include but not limited to gastroenteritis, colitis, diverticulitis, pancreatitis. [EE]   1308 Creatinine: 0.82 [EE]   1308 Sodium(!): 130 [EE]   1308 Lipase: 30 [EE]   1339 WBC(!): 14.11 [EE]   1339 Hemoglobin(!): 18.0 [EE]   1541 Recheck of patient.  He has stable vitals.  He states he still feels ill.  Given patient's leukocytosis and abnormal CT scan, plan to admit patient for observation. [EE]   1552 I discussed case with Otilia, physician assistant in the observation unit.  She agrees to admit the patient. [EE]      ED Course User Index  [EE] Chapito Kruger PA       AS OF 15:53 EDT VITALS:    BP - 137/86  HR - 64  TEMP - 97.3 °F (36.3 °C) (Tympanic)  O2 SATS - 97%        DIAGNOSIS  Final diagnoses:   Enteritis   Leukocytosis, unspecified type         DISPOSITION  Admitted      Dictated utilizing Dragon dictation     Chapito Kruger PA  08/16/22 7432

## 2022-08-16 NOTE — ED PROVIDER NOTES
The G[facility] and I have discussed this patient's history, physical exam and treatment plan.  I provided a substantive portion of the care of this patient.  I have reviewed the documentation and personally had a face to face interaction with the patient and personally performed the physical exam, in its entirety.  I affirm the documentation and agree with the treatment and plan.  The following describes my personal findings.      The patient presents complaining of vomiting, diarrhea for 3 days, vomited 5-6 times in the past 24 hours and had diarrhea 5-10 times in the past 24 hours without blood or black color.  Patient denies fever, reports he has had intermittent waves of abdominal pain which he describes as cramping in nature.  Patient denies sick contacts or recent antibiotics.      Comprehensive Physical exam:  Patient is mildly ill-appearing, conversant, oriented awake, alert  HEENT: normocephalic, atraumatic  Neck: No JVD no goiter, no pain with ROM  Pulmonary: Nontachypneic, breath sounds heard well bilaterally  cardiovascular: Tachycardic, heart rate in the 60s  Abdomen: Soft, mild diffuse tenderness to palpation r, no guarding or rebound, positive bowel sounds  musculoskeletal: Good range of motion, pulse, sensation x4  Neuro/psychiatric:calm, appropriate, cooperative  Skin:warm, dry    Discussed with Dr. Aidan Monson who reports patient's enteritis is fairly extensive from the jejunum to the TI with right-sided abdominal ascites associated.  No obstruction or acute surgical abnormality.    Given patient's significant vomiting and diarrhea anticipate observation for further testing, treatment as needed with stool studies    Patient was wearing facemask when I entered the room and throughout our encounter. Full protective equipment was worn throughout this patient encounter including a face mask, eye protection and gloves. Hand hygiene was performed before donning protective equipment and after removal when  leaving the room.           Adrienne Nguyen MD  08/16/22 8011

## 2022-08-16 NOTE — ED TRIAGE NOTES
abd pain and vomit since Sunday    Patient was placed in face mask during first look triage.  Patient was wearing a face mask throughout encounter.  I wore personal protective equipment throughout the encounter.  Hand hygiene was performed before and after patient encounter.

## 2022-08-16 NOTE — CASE MANAGEMENT/SOCIAL WORK
Discharge Planning Assessment  [facility]     Patient Name: Robert Hume  MRN: [id number]  Today's Date: 8/16/2022    Admit Date: 8/16/2022     Discharge Needs Assessment     Row Name 08/16/22 1718       Living Environment    People in Home other (see comments)    Unique Family Situation Lives with friends , Antoine Currie [phone]    Current Living Arrangements home    Primary Care Provided by self    Provides Primary Care For no one    Family Caregiver if Needed none    Quality of Family Relationships unable to assess    Able to Return to Prior Arrangements yes       Resource/Environmental Concerns    Resource/Environmental Concerns none    Transportation Concerns none       Transition Planning    Patient/Family Anticipates Transition to home    Patient/Family Anticipated Services at Transition none    Transportation Anticipated family or friend will provide       Discharge Needs Assessment    Readmission Within the Last 30 Days no previous admission in last 30 days    Equipment Currently Used at Home none    Concerns to be Addressed denies needs/concerns at this time;no discharge needs identified    Anticipated Changes Related to Illness none    Equipment Needed After Discharge none    Provided Post Acute Provider List? N/A    Provided Post Acute Provider Quality & Resource List? N/A               Discharge Plan     Row Name 08/16/22 0323       Plan    Plan Pt intends to return home upon discharge    Patient/Family in Agreement with Plan yes    Provided Post Acute Provider List? N/A    Provided Post Acute Provider Quality & Resource List? N/A    Plan Comments Face sheet verified, role of CCP explained.  Pt is independent in ADL's and denies the need for any assistive devices. Pt states that he lives with friends. Denies any difficulty paying for medications. Advised pt that if any further needs arise, to contact Case Management              Continued Care and Services - Admitted Since 8/16/2022     Coordination has not been started for this encounter.          Demographic Summary    No documentation.                Functional Status    No documentation.                Psychosocial    No documentation.                Abuse/Neglect    No documentation.                Legal    No documentation.                Substance Abuse    No documentation.                Patient Forms    No documentation.                   Evelyn Llanos RN

## 2022-08-17 ENCOUNTER — APPOINTMENT (OUTPATIENT)
Dept: CARDIOLOGY | Facility: HOSPITAL | Age: 57
End: 2022-08-17

## 2022-08-17 PROBLEM — I48.91 ATRIAL FIBRILLATION: Status: ACTIVE | Noted: 2022-08-17

## 2022-08-17 PROBLEM — D72.829 LEUKOCYTOSIS: Status: ACTIVE | Noted: 2022-08-17

## 2022-08-17 PROBLEM — E87.1 HYPONATREMIA: Status: ACTIVE | Noted: 2022-08-17

## 2022-08-17 LAB
AMPHET+METHAMPHET UR QL: NEGATIVE
ANION GAP SERPL CALCULATED.3IONS-SCNC: 10 MMOL/L (ref 5–15)
AORTIC DIMENSIONLESS INDEX: 0.7 (DI)
ASCENDING AORTA: 3.4 CM
BARBITURATES UR QL SCN: NEGATIVE
BASOPHILS # BLD AUTO: 0.05 10*3/MM3 (ref 0–0.2)
BASOPHILS NFR BLD AUTO: 0.4 % (ref 0–1.5)
BENZODIAZ UR QL SCN: NEGATIVE
BH CV ECHO MEAS - ACS: 2.07 CM
BH CV ECHO MEAS - AO MAX PG: 5 MMHG
BH CV ECHO MEAS - AO MEAN PG: 2.7 MMHG
BH CV ECHO MEAS - AO ROOT DIAM: 3.6 CM
BH CV ECHO MEAS - AO V2 MAX: 111.8 CM/SEC
BH CV ECHO MEAS - AO V2 VTI: 18.3 CM
BH CV ECHO MEAS - AVA(I,D): 3.1 CM2
BH CV ECHO MEAS - CONTRAST EF 4CH: 50 CM2
BH CV ECHO MEAS - EDV(CUBED): 101.4 ML
BH CV ECHO MEAS - EDV(MOD-SP2): 120 ML
BH CV ECHO MEAS - EDV(MOD-SP4): 136 ML
BH CV ECHO MEAS - EF(MOD-BP): 38.1 %
BH CV ECHO MEAS - EF(MOD-SP2): 36.7 %
BH CV ECHO MEAS - EF(MOD-SP4): 43.4 %
BH CV ECHO MEAS - ESV(CUBED): 42.6 ML
BH CV ECHO MEAS - ESV(MOD-SP2): 76 ML
BH CV ECHO MEAS - ESV(MOD-SP4): 77 ML
BH CV ECHO MEAS - FS: 25.1 %
BH CV ECHO MEAS - IVS/LVPW: 1.25 CM
BH CV ECHO MEAS - IVSD: 1.29 CM
BH CV ECHO MEAS - LV MASS(C)D: 198.8 GRAMS
BH CV ECHO MEAS - LV MAX PG: 2.6 MMHG
BH CV ECHO MEAS - LV MEAN PG: 1.43 MMHG
BH CV ECHO MEAS - LV V1 MAX: 80.1 CM/SEC
BH CV ECHO MEAS - LV V1 VTI: 13.4 CM
BH CV ECHO MEAS - LVIDD: 4.7 CM
BH CV ECHO MEAS - LVIDS: 3.5 CM
BH CV ECHO MEAS - LVOT AREA: 4.3 CM2
BH CV ECHO MEAS - LVOT DIAM: 2.34 CM
BH CV ECHO MEAS - LVPWD: 1.03 CM
BH CV ECHO MEAS - MV DEC SLOPE: 568.8 CM/SEC2
BH CV ECHO MEAS - MV MAX PG: 3.3 MMHG
BH CV ECHO MEAS - MV MEAN PG: 0.86 MMHG
BH CV ECHO MEAS - MV P1/2T: 49.8 MSEC
BH CV ECHO MEAS - MV V2 VTI: 23.5 CM
BH CV ECHO MEAS - MVA(P1/2T): 4.4 CM2
BH CV ECHO MEAS - MVA(VTI): 2.46 CM2
BH CV ECHO MEAS - PA ACC TIME: 0.1 SEC
BH CV ECHO MEAS - PA PR(ACCEL): 36.2 MMHG
BH CV ECHO MEAS - PA V2 MAX: 98.5 CM/SEC
BH CV ECHO MEAS - QP/QS: 0.42
BH CV ECHO MEAS - RAP SYSTOLE: 3 MMHG
BH CV ECHO MEAS - RV MAX PG: 1.95 MMHG
BH CV ECHO MEAS - RV V1 MAX: 69.8 CM/SEC
BH CV ECHO MEAS - RV V1 VTI: 9.7 CM
BH CV ECHO MEAS - RVOT DIAM: 1.79 CM
BH CV ECHO MEAS - SV(LVOT): 57.8 ML
BH CV ECHO MEAS - SV(MOD-SP2): 44 ML
BH CV ECHO MEAS - SV(MOD-SP4): 59 ML
BH CV ECHO MEAS - SV(RVOT): 24.4 ML
BH CV ECHO MEAS - TAPSE (>1.6): 2 CM
BH CV VAS BP RIGHT ARM: NORMAL MMHG
BH CV XLRA - RV BASE: 3.7 CM
BH CV XLRA - RV LENGTH: 5.6 CM
BH CV XLRA - RV MID: 3.6 CM
BH CV XLRA - TDI S': 13.5 CM/SEC
BUN SERPL-MCNC: 8 MG/DL (ref 6–20)
BUN/CREAT SERPL: 15.7 (ref 7–25)
CALCIUM SPEC-SCNC: 8.4 MG/DL (ref 8.6–10.5)
CANNABINOIDS SERPL QL: NEGATIVE
CHLORIDE SERPL-SCNC: 102 MMOL/L (ref 98–107)
CO2 SERPL-SCNC: 22 MMOL/L (ref 22–29)
COCAINE UR QL: NEGATIVE
CREAT SERPL-MCNC: 0.51 MG/DL (ref 0.76–1.27)
D-LACTATE SERPL-SCNC: 0.8 MMOL/L (ref 0.5–2)
DEPRECATED RDW RBC AUTO: 44.8 FL (ref 37–54)
EGFRCR SERPLBLD CKD-EPI 2021: 117.5 ML/MIN/1.73
EOSINOPHIL # BLD AUTO: 0.1 10*3/MM3 (ref 0–0.4)
EOSINOPHIL NFR BLD AUTO: 0.8 % (ref 0.3–6.2)
ERYTHROCYTE [DISTWIDTH] IN BLOOD BY AUTOMATED COUNT: 12.9 % (ref 12.3–15.4)
GLUCOSE SERPL-MCNC: 96 MG/DL (ref 65–99)
HCT VFR BLD AUTO: 48.6 % (ref 37.5–51)
HGB BLD-MCNC: 17 G/DL (ref 13–17.7)
IMM GRANULOCYTES # BLD AUTO: 0.09 10*3/MM3 (ref 0–0.05)
IMM GRANULOCYTES NFR BLD AUTO: 0.7 % (ref 0–0.5)
LEFT ATRIUM VOLUME INDEX: 38.6 ML/M2
LYMPHOCYTES # BLD AUTO: 1.76 10*3/MM3 (ref 0.7–3.1)
LYMPHOCYTES NFR BLD AUTO: 13.8 % (ref 19.6–45.3)
MAGNESIUM SERPL-MCNC: 1.4 MG/DL (ref 1.6–2.6)
MAXIMAL PREDICTED HEART RATE: 162 BPM
MCH RBC QN AUTO: 33.8 PG (ref 26.6–33)
MCHC RBC AUTO-ENTMCNC: 35 G/DL (ref 31.5–35.7)
MCV RBC AUTO: 96.6 FL (ref 79–97)
METHADONE UR QL SCN: NEGATIVE
MONOCYTES # BLD AUTO: 1.57 10*3/MM3 (ref 0.1–0.9)
MONOCYTES NFR BLD AUTO: 12.3 % (ref 5–12)
NEUTROPHILS NFR BLD AUTO: 72 % (ref 42.7–76)
NEUTROPHILS NFR BLD AUTO: 9.16 10*3/MM3 (ref 1.7–7)
NRBC BLD AUTO-RTO: 0 /100 WBC (ref 0–0.2)
OPIATES UR QL: NEGATIVE
OXYCODONE UR QL SCN: POSITIVE
PHOSPHATE SERPL-MCNC: 2.8 MG/DL (ref 2.5–4.5)
PLATELET # BLD AUTO: 304 10*3/MM3 (ref 140–450)
PMV BLD AUTO: 9.9 FL (ref 6–12)
POTASSIUM SERPL-SCNC: 4.4 MMOL/L (ref 3.5–5.2)
QT INTERVAL: 292 MS
RBC # BLD AUTO: 5.03 10*6/MM3 (ref 4.14–5.8)
SINUS: 3.6 CM
SODIUM SERPL-SCNC: 134 MMOL/L (ref 136–145)
STJ: 3.4 CM
STRESS TARGET HR: 138 BPM
TROPONIN T SERPL-MCNC: <0.01 NG/ML (ref 0–0.03)
TSH SERPL DL<=0.05 MIU/L-ACNC: 2.25 UIU/ML (ref 0.27–4.2)
WBC NRBC COR # BLD: 12.73 10*3/MM3 (ref 3.4–10.8)

## 2022-08-17 PROCEDURE — 96375 TX/PRO/DX INJ NEW DRUG ADDON: CPT

## 2022-08-17 PROCEDURE — 25010000002 HYDROMORPHONE PER 4 MG: Performed by: EMERGENCY MEDICINE

## 2022-08-17 PROCEDURE — 96376 TX/PRO/DX INJ SAME DRUG ADON: CPT

## 2022-08-17 PROCEDURE — G0378 HOSPITAL OBSERVATION PER HR: HCPCS

## 2022-08-17 PROCEDURE — 80307 DRUG TEST PRSMV CHEM ANLYZR: CPT | Performed by: HOSPITALIST

## 2022-08-17 PROCEDURE — 84100 ASSAY OF PHOSPHORUS: CPT | Performed by: HOSPITALIST

## 2022-08-17 PROCEDURE — 25010000002 PERFLUTREN (DEFINITY) 8.476 MG IN SODIUM CHLORIDE (PF) 0.9 % 10 ML INJECTION: Performed by: INTERNAL MEDICINE

## 2022-08-17 PROCEDURE — 83735 ASSAY OF MAGNESIUM: CPT | Performed by: HOSPITALIST

## 2022-08-17 PROCEDURE — 25010000002 MAGNESIUM SULFATE 2 GM/50ML SOLUTION: Performed by: HOSPITALIST

## 2022-08-17 PROCEDURE — 96365 THER/PROPH/DIAG IV INF INIT: CPT

## 2022-08-17 PROCEDURE — 96372 THER/PROPH/DIAG INJ SC/IM: CPT

## 2022-08-17 PROCEDURE — 96361 HYDRATE IV INFUSION ADD-ON: CPT

## 2022-08-17 PROCEDURE — 84443 ASSAY THYROID STIM HORMONE: CPT | Performed by: INTERNAL MEDICINE

## 2022-08-17 PROCEDURE — 96366 THER/PROPH/DIAG IV INF ADDON: CPT

## 2022-08-17 PROCEDURE — 99244 OFF/OP CNSLTJ NEW/EST MOD 40: CPT | Performed by: INTERNAL MEDICINE

## 2022-08-17 PROCEDURE — 63710000001 ONDANSETRON PER 8 MG: Performed by: STUDENT IN AN ORGANIZED HEALTH CARE EDUCATION/TRAINING PROGRAM

## 2022-08-17 PROCEDURE — 93306 TTE W/DOPPLER COMPLETE: CPT

## 2022-08-17 PROCEDURE — 25010000002 DIGOXIN PER 500 MCG: Performed by: INTERNAL MEDICINE

## 2022-08-17 PROCEDURE — 25010000002 ENOXAPARIN PER 10 MG: Performed by: INTERNAL MEDICINE

## 2022-08-17 PROCEDURE — 80048 BASIC METABOLIC PNL TOTAL CA: CPT | Performed by: STUDENT IN AN ORGANIZED HEALTH CARE EDUCATION/TRAINING PROGRAM

## 2022-08-17 PROCEDURE — 93306 TTE W/DOPPLER COMPLETE: CPT | Performed by: INTERNAL MEDICINE

## 2022-08-17 RX ORDER — ATENOLOL 25 MG/1
25 TABLET ORAL EVERY 12 HOURS SCHEDULED
Status: DISCONTINUED | OUTPATIENT
Start: 2022-08-17 | End: 2022-08-19

## 2022-08-17 RX ORDER — DIGOXIN 0.25 MG/ML
500 INJECTION INTRAMUSCULAR; INTRAVENOUS ONCE
Status: COMPLETED | OUTPATIENT
Start: 2022-08-17 | End: 2022-08-17

## 2022-08-17 RX ORDER — PANTOPRAZOLE SODIUM 40 MG/1
40 TABLET, DELAYED RELEASE ORAL
Status: DISCONTINUED | OUTPATIENT
Start: 2022-08-17 | End: 2022-08-19

## 2022-08-17 RX ORDER — MAGNESIUM SULFATE HEPTAHYDRATE 40 MG/ML
2 INJECTION, SOLUTION INTRAVENOUS ONCE
Status: COMPLETED | OUTPATIENT
Start: 2022-08-17 | End: 2022-08-17

## 2022-08-17 RX ORDER — OXYCODONE HYDROCHLORIDE AND ACETAMINOPHEN 5; 325 MG/1; MG/1
1 TABLET ORAL EVERY 4 HOURS PRN
Status: DISCONTINUED | OUTPATIENT
Start: 2022-08-17 | End: 2022-08-19 | Stop reason: HOSPADM

## 2022-08-17 RX ORDER — ENOXAPARIN SODIUM 100 MG/ML
1 INJECTION SUBCUTANEOUS EVERY 12 HOURS
Status: DISCONTINUED | OUTPATIENT
Start: 2022-08-17 | End: 2022-08-19 | Stop reason: HOSPADM

## 2022-08-17 RX ADMIN — PERFLUTREN 1.5 ML: 6.52 INJECTION, SUSPENSION INTRAVENOUS at 10:50

## 2022-08-17 RX ADMIN — METOPROLOL TARTRATE 5 MG: 1 INJECTION, SOLUTION INTRAVENOUS at 00:41

## 2022-08-17 RX ADMIN — SODIUM CHLORIDE 175 ML/HR: 9 INJECTION, SOLUTION INTRAVENOUS at 07:12

## 2022-08-17 RX ADMIN — ENOXAPARIN SODIUM 80 MG: 100 INJECTION SUBCUTANEOUS at 22:54

## 2022-08-17 RX ADMIN — ATENOLOL 25 MG: 25 TABLET ORAL at 22:54

## 2022-08-17 RX ADMIN — OXYCODONE AND ACETAMINOPHEN 1 TABLET: 5; 325 TABLET ORAL at 10:57

## 2022-08-17 RX ADMIN — METOPROLOL TARTRATE 5 MG: 1 INJECTION, SOLUTION INTRAVENOUS at 00:30

## 2022-08-17 RX ADMIN — SODIUM CHLORIDE 75 ML/HR: 9 INJECTION, SOLUTION INTRAVENOUS at 21:08

## 2022-08-17 RX ADMIN — HYDROMORPHONE HYDROCHLORIDE 0.5 MG: 1 INJECTION, SOLUTION INTRAMUSCULAR; INTRAVENOUS; SUBCUTANEOUS at 05:00

## 2022-08-17 RX ADMIN — OXYCODONE AND ACETAMINOPHEN 1 TABLET: 5; 325 TABLET ORAL at 21:08

## 2022-08-17 RX ADMIN — DIGOXIN 500 MCG: 0.25 INJECTION INTRAMUSCULAR; INTRAVENOUS at 10:57

## 2022-08-17 RX ADMIN — OXYCODONE AND ACETAMINOPHEN 1 TABLET: 5; 325 TABLET ORAL at 15:33

## 2022-08-17 RX ADMIN — HYDROMORPHONE HYDROCHLORIDE 0.5 MG: 1 INJECTION, SOLUTION INTRAMUSCULAR; INTRAVENOUS; SUBCUTANEOUS at 07:13

## 2022-08-17 RX ADMIN — ONDANSETRON HYDROCHLORIDE 4 MG: 4 TABLET, FILM COATED ORAL at 05:00

## 2022-08-17 RX ADMIN — ENOXAPARIN SODIUM 80 MG: 100 INJECTION SUBCUTANEOUS at 10:58

## 2022-08-17 RX ADMIN — PANTOPRAZOLE SODIUM 40 MG: 40 TABLET, DELAYED RELEASE ORAL at 12:59

## 2022-08-17 RX ADMIN — MAGNESIUM SULFATE HEPTAHYDRATE 2 G: 40 INJECTION, SOLUTION INTRAVENOUS at 15:26

## 2022-08-17 RX ADMIN — Medication 10 ML: at 21:08

## 2022-08-17 RX ADMIN — ATENOLOL 25 MG: 25 TABLET ORAL at 10:57

## 2022-08-17 RX ADMIN — Medication 10 ML: at 08:45

## 2022-08-17 RX ADMIN — SODIUM CHLORIDE 1000 ML: 9 INJECTION, SOLUTION INTRAVENOUS at 08:44

## 2022-08-17 NOTE — PLAN OF CARE
Goal Outcome Evaluation:      Patient tachycardia addressed, pain improving, patient remains afebrile

## 2022-08-17 NOTE — PROGRESS NOTES
ED OBSERVATION PROGRESS/DISCHARGE SUMMARY    Date of Admission: 8/16/2022   LOS: 0 days   PCP: Provider, No Known    Final Diagnosis enteritis, atrial fibrillation with rapid ventricular response    Subjective     Hospital Outcome:   Patient is an afebrile ambulatory 57-year-old  male admitted to the observation unit for abdominal pain, nausea vomiting, enteritis and new onset atrial fibrillation.    This morning when I evaluated him he is complaining of persistent abdominal discomfort and nausea.  He was given IV analgesics overnight with minimal relief of his discomfort.  His heart rate is labile between 70 and 130s.    He was seen and evaluated by cardiology service who believe that his enteritis needs further treatment on an inpatient level as this is likely an aggravating factor for the atrial fibrillation that he has been newly diagnosed with.     Call was placed to the hospitalist service, Dr. Shirley on-call for A has graciously excepted to admit and transfer    ROS:  General: no fevers, chills  Respiratory: no cough, dyspnea  Cardiovascular: no chest pain, + palpitations with tachycardia  Abdomen: Persistent abdominal pain, nausea, and vomiting, no diarrhea  Neurologic: No focal weakness    Objective   Physical Exam:  I have reviewed the vital signs.  Temp:  [97.3 °F (36.3 °C)-98.5 °F (36.9 °C)] 98.3 °F (36.8 °C)  Heart Rate:  [] 137  Resp:  [16-20] 20  BP: (129-167)/() 149/110  General Appearance:    Alert, cooperative, comfortable appearing  Head:    Normocephalic, atraumatic  Eyes:    Sclerae anicteric  Neck:   Supple, no mass  Lungs: Clear to auscultation bilaterally, respirations unlabored  Heart: Irregularly irregular rhythm, S1 and S2 normal, no murmur, rub or gallop no peripheral edema  Abdomen:  Soft, mild to moderate diffuse abdominal discomfort, no rebound or guarding, bowel sounds active, nondistended  Extremities: No clubbing, cyanosis, or edema to lower  extremities  Pulses:  2+ and symmetric in distal lower extremities  Skin: No rashes   Neurologic: Oriented x3, Normal strength to extremities    Results Review:    I have reviewed the labs, radiology results and diagnostic studies.    Results from last 7 days   Lab Units 08/16/22  2354   WBC 10*3/mm3 12.73*   HEMOGLOBIN g/dL 17.0   HEMATOCRIT % 48.6   PLATELETS 10*3/mm3 304     Results from last 7 days   Lab Units 08/17/22  0451 08/16/22  1142   SODIUM mmol/L 134* 130*   POTASSIUM mmol/L 4.4 4.4   CHLORIDE mmol/L 102 98   CO2 mmol/L 22.0 21.8*   BUN mg/dL 8 12   CREATININE mg/dL 0.51* 0.82   CALCIUM mg/dL 8.4* 9.5   BILIRUBIN mg/dL  --  0.8   ALK PHOS U/L  --  82   ALT (SGPT) U/L  --  15   AST (SGOT) U/L  --  18   GLUCOSE mg/dL 96 104*     Imaging Results (Last 24 Hours)     Procedure Component Value Units Date/Time    CT Abdomen Pelvis With Contrast [[id number]] Collected: 08/16/22 1413     Updated: 08/16/22 1428    Narrative:      CT ABDOMEN AND PELVIS WITH IV CONTRAST     HISTORY: Nausea, vomiting, diarrhea.     TECHNIQUE: Radiation dose reduction techniques were utilized, including  automated exposure control and exposure modulation based on body size.   3 mm images were obtained through the abdomen and pelvis after the  administration of IV contrast.      COMPARISON: None     FINDINGS:  Lower chest: Patchy ground-glass opacities in the lung bases, right  greater than left favoring atelectasis and/or scarring, please correlate  clinically and follow-up as indicated.     The heart size is within normal limits.     Liver: Focal fat infiltration. Mild lobular contour. Too small to  characterize sub-5 mm hypodensity in the right hepatic lobe.     Biliary tract: Within normal limits.     Spleen: Calcified granulomas.     Pancreas: Within normal limits.     Adrenals: Within normal limits.     Kidneys:  Within normal limits.     Bowel:  The stomach is incompletely distended. There is a long segment  of bowel wall  thickening and mucosal hyperenhancement involving the mid  to distal jejunum to the terminal ileum. The appendix is within normal  limits. The colon is incompletely distended. No free air or obvious  pneumatosis. Small volume ascites.     Vasculature:    Extensive atherosclerosis abdominal aorta and branch  vessels. The study is not optimized for vascular evaluation however,  normal enhancement is present centrally.     Lymph Nodes:  Within normal limits.                                                            Pelvic organs: The bladder is incompletely distended. Prostate in situ.     Abdominal/Pelvic Wall: Within normal limits.     Bones: Multilevel degenerative changes thoracolumbar spine. Arthropathic  changes hips, right greater than left.       Impression:      . IMPRESSION:  1.  Moderate-to-severe enteritis extending from the mid jejunum to the  terminal ileum (likely infectious or inflammatory) with small volume  ascites. Recommend clinical correlation and attention on short-term  follow-up after treatment.  2.   Extensive atherosclerosis thoracoabdominal aorta and branch  vessels.  3.  Bibasilar airspace disease favoring atelectasis and/or scar which  can be followed on subsequent surveillance imaging.  4.  Please see above for additional findings.     I discussed these findings with Dr. Adrienne Nguyen at 1:24 PM on 08/16/2022.           This report was finalized on 8/16/2022 2:24 PM by Dr. Juan R Monson M.D.             I have reviewed the medications.  ---------------------------------------------------------------------------------------------  Assessment & Plan   Assessment/Problem List    Enteritis      Plan:    Abdominal pain/enteritis:  Pain control  Bowel Rest  Antiemetics  IV hydration    New onset afib RVR  Consult to cardiology, seen by dr. gómez who agrees pt needs further admission for enteritis treatment and will address patient's irregular heart rate and anticoagulation needs    Disposition:  admitted to [facility] service, Dr. Shirley      This note will serve as a transfer note    PRISCILA Madrid 08/17/22 09:05 EDT          I have worn appropriate PPE during this patient encounter, sanitized my hands both with entering and exiting patient's room.

## 2022-08-17 NOTE — PLAN OF CARE
Problem: Adult Inpatient Plan of Care  Goal: Plan of Care Review  Outcome: Ongoing, Progressing  Flowsheets (Taken 8/17/2022 1658)  Progress: improving  Plan of Care Reviewed With: patient  Outcome Evaluation: VSS. pain controlled with prn pain medications, mag replaced no S/S of discomfort or distress will continue to montior  Goal: Patient-Specific Goal (Individualized)  Outcome: Ongoing, Progressing  Goal: Absence of Hospital-Acquired Illness or Injury  Outcome: Ongoing, Progressing  Intervention: Identify and Manage Fall Risk  Recent Flowsheet Documentation  Taken 8/17/2022 1630 by Zakia Ambrosio RN  Safety Promotion/Fall Prevention:   activity supervised   assistive device/personal items within reach   clutter free environment maintained   fall prevention program maintained   nonskid shoes/slippers when out of bed   safety round/check completed  Taken 8/17/2022 1504 by Zakia Ambrosio RN  Safety Promotion/Fall Prevention:   activity supervised   clutter free environment maintained   assistive device/personal items within reach   fall prevention program maintained   nonskid shoes/slippers when out of bed   safety round/check completed  Intervention: Prevent and Manage VTE (Venous Thromboembolism) Risk  Recent Flowsheet Documentation  Taken 8/17/2022 1630 by Zakia Ambrosio RN  Activity Management: activity adjusted per tolerance  Taken 8/17/2022 1504 by Zakia Ambrosio RN  Activity Management: activity adjusted per tolerance  Intervention: Prevent Infection  Recent Flowsheet Documentation  Taken 8/17/2022 1630 by Zakia Ambrosio RN  Infection Prevention: single patient room provided  Taken 8/17/2022 1504 by Zakia Ambrosio RN  Infection Prevention: single patient room provided  Goal: Optimal Comfort and Wellbeing  Outcome: Ongoing, Progressing  Intervention: Provide Person-Centered Care  Recent Flowsheet Documentation  Taken 8/17/2022 1504 by Zakia Ambrosio RN  Trust Relationship/Rapport:   care  explained   choices provided  Goal: Readiness for Transition of Care  Outcome: Ongoing, Progressing     Problem: Nausea and Vomiting  Goal: Fluid and Electrolyte Balance  Outcome: Ongoing, Progressing     Problem: Pain Acute  Goal: Acceptable Pain Control and Functional Ability  Outcome: Ongoing, Progressing  Intervention: Prevent or Manage Pain  Recent Flowsheet Documentation  Taken 8/17/2022 1630 by Zakia Ambrosio RN  Medication Review/Management: medications reviewed  Taken 8/17/2022 1504 by Zakia Ambrosio RN  Medication Review/Management: medications reviewed   Goal Outcome Evaluation:  Plan of Care Reviewed With: patient        Progress: improving  Outcome Evaluation: VSS. pain controlled with prn pain medications, mag replaced no S/S of discomfort or distress will continue to montior

## 2022-08-17 NOTE — CONSULTS
CONSULT NOTE    INTERNAL MEDICINE   [facility]     Referring Provider: Dr. Arce  Reason for Consultation: Abdominal pain and diarrhea  Chief complaint: Abdominal pain    Subjective .     History of present illness:  This is a 57-year-old gentleman with relative lack of medical history who does smoke and does drink alcohol who presents to the hospital with nausea vomiting diarrhea and abdominal pain.  His symptoms started on Saturday progressively worsened and presented to the emergency room on Tuesday with continued symptoms.  He was found to be dehydrated with abdominal pain and was admitted to the observation unit for stabilization and evaluation and symptomatic treatment of his enteritis.  He said no further diarrhea and nausea and vomiting has resolved with antiemetic therapy.  He still he having a lot of abdominal pain mainly in the midepigastric and left upper quadrant region.  He has been tolerating a full liquid diet at this point without any further symptoms.  His last drink was on Saturday he last smoked a cigarette at that time as well.  Early this morning he developed a tachycardic rhythm and was found to be in atrial fibrillation.  Cardiology was consulted and is recommended some further work-up and evaluation with echocardiogram and other lab testing.  He is been initiated on a beta-blocker and anticoagulant at this time.  Given the ongoing complexity of the case he was recommended for admission to the hospital for further evaluation and management.    Review of Systems  Review of Systems  The following systems were reviewed ;  Constitution-No fevers chills weight loss or gain, eyes-blurry vision and vision loss, ENT-no bloody nose or runny nose no sore throat, respiratory-no shortness of breath cough, cardiovascular-no chest pain or palpitations, gastrointestinal-constipation or blood in stool, genitourinary-no dysuria or hematuria, integument, hematologic / lymphatic-abnormal  "bruising or bleeding,no rash, musculoskeletal-is had chronic pain in his right hip but presently pain controlled, neurological-as any numbness or tingling, behavioral/psych-eyes history of depression, endocrine- no heat or cold intolerance no polyuria or polydipsia and allergies / immunologic-no itching or watery eyes    History reviewed. No pertinent past medical history.  History reviewed. No pertinent surgical history.  History reviewed. No pertinent family history.  Social History     Tobacco Use   • Smoking status: Current Every Day Smoker     Packs/day: 1.00     Types: Cigarettes   • Smokeless tobacco: Never Used   Substance Use Topics   • Alcohol use: Never   • Drug use: Never     No medications prior to admission.     atenolol, 25 mg, Oral, Q12H  enoxaparin, 1 mg/kg, Subcutaneous, Q12H  pantoprazole, 40 mg, Oral, Q AM  sodium chloride, 10 mL, Intravenous, Q12H      Allergies:   Penicillins    Objective     Vital Signs   Temp:  [97.3 °F (36.3 °C)-98.5 °F (36.9 °C)] 98.3 °F (36.8 °C)  Heart Rate:  [] 137  Resp:  [16-20] 20  BP: (129-167)/() 149/110    Intake/Output Summary (Last 24 hours) at 8/17/2022 1117  Last data filed at 8/17/2022 0819  Gross per 24 hour   Intake 2000 ml   Output 300 ml   Net 1700 ml     Flowsheet Rows    Flowsheet Row First Filed Value   Admission Height 175.3 cm (69\") Documented at 08/16/2022 1818   Admission Weight 80.2 kg (176 lb 14.4 oz) Documented at 08/16/2022 1818          Physical Exam:     General Appearance:    Alert, cooperative, in no acute distress   Head:    Normocephalic, without obvious abnormality, atraumatic   Eyes:            Lids and lashes normal, conjunctivae and sclerae normal, no   icterus, no pallor, corneas clear, PERRLA   Ears:    Ears appear intact with no abnormalities noted   Throat:   No oral lesions, no thrush, oral mucosa moist   Neck:   No adenopathy, supple, trachea midline, no thyromegaly, no   carotid bruit, no JVD   Back:     No kyphosis " present, no scoliosis present, no skin lesions,      erythema or scars, no tenderness to percussion or                   palpation,   range of motion normal   Lungs:     Clear to auscultation,respirations regular, even and                  unlabored    Heart:    Regular rhythm and normal rate, normal S1 and S2, no            murmur, no gallop, no rub, no click   Chest Wall:    No abnormalities observed   Abdomen:     Normal bowel sounds, no masses, no organomegaly, soft        Tender in the mid epigastric region and left upper quadrant, non-distended, no guarding, no rebound tenderness   Rectal:     Deferred   Extremities:   Moves all extremities well, no edema, no cyanosis, no             redness   Pulses:   Pulses palpable and equal bilaterally   Skin:   No bleeding, bruising or rash   Lymph nodes:   No palpable adenopathy   Neurologic:   Cranial nerves 2 - 12 grossly intact, sensation intact, DTR       present and equal bilaterally       Results Review:   I reviewed the patient's new clinical results.  I reviewed the patient's new imaging results and agree with the interpretation.  I reviewed the patient's other test results and agree with the interpretation    Results from last 7 days   Lab Units 08/16/22  2354   WBC 10*3/mm3 12.73*   HEMOGLOBIN g/dL 17.0   HEMATOCRIT % 48.6   PLATELETS 10*3/mm3 304     Results from last 7 days   Lab Units 08/17/22  0451   SODIUM mmol/L 134*   POTASSIUM mmol/L 4.4   CHLORIDE mmol/L 102   CO2 mmol/L 22.0   BUN mg/dL 8   CREATININE mg/dL 0.51*   GLUCOSE mg/dL 96   CALCIUM mg/dL 8.4*     Results from last 7 days   Lab Units 08/16/22  1142   ALK PHOS U/L 82   BILIRUBIN mg/dL 0.8   ALT (SGPT) U/L 15   AST (SGOT) U/L 18         Results from last 7 days   Lab Units 08/16/22  2354   TROPONIN T ng/mL <0.010         Assessment & Plan       Enteritis    Atrial fibrillation (HCC)    Hyponatremia    Leukocytosis    This is a 58 yo gentleman with hx of etoh and tobacco abuse who pw abd pain  nausea vomiting and diarrhea and developed new atrial fibrillation during admission  -slow fluids and continue full liquid diet  -agree with cardiology recs on the Afib await echo and TSH  -If no diarrhea today will cancel CDif and GI PCR  -Full code  -pharmacologic dvt prophylaxis    I discussed the patients findings and my recommendations with patient, family and nursing staff    Thank you very much for allowing us to participate in your patient's care.    Zach Shirley MD  08/17/22  11:17 EDT    Time: 60 mins

## 2022-08-17 NOTE — H&P
[facility]   HISTORY AND PHYSICAL    Patient Name: Robert Hume  : 1965  MRN: [id number]  Primary Care Physician:  Provider, No Known  Date of admission: 2022    Subjective   Subjective     Chief Complaint:   Chief Complaint   Patient presents with   • Abdominal Pain         HPI:    Robert Hume is a 57 y.o. male who has been accepted to the observation unit for 4 day history of constant abdominal pain, nausea, nonbilious nonbloody vomiting, and yellow watery diarrhea. States he has been unable to keep fluids or food down due to vomiting. Denies any sick contacts or being around anyone with similar symptoms. No recent antibiotic use or recent travel. He tried taking pepto bismol earlier today without any relief. In the ED the patient had labs that showed leukocytosis and a CT of the abdomen and pelvis that showed severe gastroenteritis without obstruction or findings of acute/surgical abdomen.     Review of Systems   All systems were reviewed and negative except for: abdominal pain, nausea, vomiting, diarrhea    Personal History     History reviewed. No pertinent past medical history.    History reviewed. No pertinent surgical history.    Family History: family history is not on file. Otherwise pertinent FHx was reviewed and not pertinent to current issue.    Social History:  reports that he has been smoking cigarettes. He has been smoking about 1.00 pack per day. He has never used smokeless tobacco. He reports that he does not drink alcohol and does not use drugs.    Home Medications:       Allergies:  Allergies   Allergen Reactions   • Penicillins Rash       Objective   Objective     Vitals:   Temp:  [97.3 °F (36.3 °C)-98.4 °F (36.9 °C)] 98.4 °F (36.9 °C)  Heart Rate:  [] 126  Resp:  [16-18] 18  BP: (129-167)/() 165/99  Physical Exam    Constitutional: Awake, alert   Eyes: PERRLA, sclerae anicteric, no conjunctival injection   HENT: NCAT, mucous membranes dry   Neck: Supple, no  thyromegaly, no lymphadenopathy, trachea midline   Respiratory: Clear to auscultation bilaterally, nonlabored respirations    Cardiovascular: RRR, no murmurs, rubs, or gallops, palpable pedal pulses bilaterally   Gastrointestinal: Positive bowel sounds, soft, diffusely tender to palpation, nondistended   Musculoskeletal: No bilateral ankle edema, no clubbing or cyanosis to extremities   Psychiatric: Appropriate affect, cooperative   Neurologic: Oriented x 3, strength symmetric in all extremities, Cranial Nerves grossly intact to confrontation, speech clear   Skin: No rashes     Result Review    Result Review:  I have personally reviewed the results from the time of this admission to 8/16/2022 21:58 EDT and agree with these findings:  [x]  Laboratory list / accordion  []  Microbiology  [x]  Radiology  []  EKG/Telemetry   []  Cardiology/Vascular   []  Pathology  []  Old records  []  Other:  Most notable findings include: Ketonuria, sodium 130, white blood cell count 14, hemoglobin 18 all likely due to dehydration.  CT shows severe diffuse enteritis      Assessment & Plan   Assessment / Plan     Brief Patient Summary:  Robert Hume is a 57 y.o. male who has been accepted to the observation unit for rehydration and pain control for his severe enteritis.    Active Hospital Problems:  Active Hospital Problems    Diagnosis    • Enteritis      Plan:   Abdominal pain/enteritis:  Pain control  Bowel Rest  Antiemetics  IV hydration  Recheck BMP in the morning      DVT prophylaxis:  Mechanical DVT prophylaxis orders are present.    CODE STATUS:    Code Status (Patient has no pulse and is not breathing): CPR (Attempt to Resuscitate)  Medical Interventions (Patient has pulse or is breathing): Full Support    Admission Status:  I believe this patient meets observation status.    Electronically signed by Janna Peng PA-C, 08/16/22, 9:58 PM EDT.

## 2022-08-17 NOTE — PROGRESS NOTES
MD ATTESTATION NOTE    The GARDEN[facility] and I have discussed this patient's history, physical exam, and treatment plan.  I have reviewed the documentation and personally had a face to face interaction with the patient. I affirm the documentation and agree with the treatment and plan.  The attached note describes my personal findings.      I provided a substantive portion of the care of the patient.  I personally performed the physical exam in its entirety, and below are my findings.  For this patient encounter, the patient wore surgical mask, I wore full protective PPE including N95 and eye protection.      Brief HPI: Patient was admitted for enteritis.  Patient went into A. fib with a rapid rate overnight.  He does not have a history of A. fib.  Denies chest pain, or shortness of breath.  He has not had any further vomiting or diarrhea.    PHYSICAL EXAM  ED Triage Vitals   Temp Heart Rate Resp BP SpO2   08/16/22 1126 08/16/22 1126 08/16/22 1126 08/16/22 1135 08/16/22 1126   97.3 °F (36.3 °C) (!) 123 16 (!) 155/105 96 %      Temp src Heart Rate Source Patient Position BP Location FiO2 (%)   08/16/22 1126 08/16/22 1126 08/16/22 1818 08/16/22 1818 --   Tympanic Monitor Lying Right arm          GENERAL: Awake and alert.  No acute distress  HENT: nares patent  EYES: no scleral icterus  CV: Irregularly irregular rhythm, normal rate  RESPIRATORY: normal effort, clear to auscultation bilaterally  ABDOMEN: soft, there is mild generalized tenderness without rebound or guarding  MUSCULOSKELETAL: no deformity  NEURO: alert, moves all extremities, follows commands  PSYCH:  calm, cooperative  SKIN: warm, dry    Vital signs and nursing notes reviewed.        Plan: Patient has new onset A. fib.  Rate is controlled.  He has been seen by cardiology and will be started on beta-blockers and Lovenox..  White blood cell count is slightly improved.  Electrolytes are unremarkable.  Patient is getting IV fluids.  He will be admitted to the  hospitalist.

## 2022-08-17 NOTE — CONSULTS
Date of Hospital Visit: 2022  Encounter Provider: Brady Marie MD  Place of Service: [facility] CARDIOLOGY  Patient Name: Robert Hume  :1965  Referral Provider: No ref. provider found    Chief complaint abdominal pain, vomiting and diarrhea    History of Present Illness Robert Hume is a 57 year old pt with a history of smoking and alcohol abuse.  He was admitted in May with cellulitis of the arm.  He denies past cardiac history.    Pt presented to ER on 22 with complaints of a 4 day history of sudden onset of constant abdominal pain, vomiting and diarrhea. Pt reported he has not been able to keep food or fluids down due to vomiting. Pt described abdominal pain as crampy and moderate in nature. In ER, , /105, , WBC 14.11, HGB 18.0, UA was negative for nitrates, COVID 19 was negative, CT of ABD/pelvis showed Moderate-to-severe enteritis extending from the mid jejunum to the terminal ileum (likely infectious or inflammatory) with small volume ascites. Recommend clinical correlation and attention on short-term follow-up after treatment. 2.   Extensive atherosclerosis thoracoabdominal aorta and branch vessels. 3.  Bibasilar airspace disease favoring atelectasis and/or scar which can be followed on subsequent surveillance imaging.      States he is fairly active at work with no chest pain or shortness of air.  No orthopnea, PND or edema.  He feels palpitations but no dizziness or syncope.  He drinks at least 6 beers daily.  He smokes a pack of cigarettes daily.  He denies illicit drug use.  No family history of premature coronary artery disease or sudden cardiac death.    HPI was reviewed, updated and amended when necessary.        History reviewed. No pertinent past medical history.    History reviewed. No pertinent surgical history.    No medications prior to admission.       Current Meds  Scheduled Meds:sodium chloride, 1,000 mL, Intravenous,  "Once  sodium chloride, 10 mL, Intravenous, Q12H      Continuous Infusions:sodium chloride, 175 mL/hr, Last Rate: 175 mL/hr (08/17/22 0712)      PRN Meds:.•  acetaminophen  •  melatonin  •  metoprolol tartrate  •  nitroglycerin  •  ondansetron **OR** ondansetron  •  oxyCODONE-acetaminophen  •  sodium chloride  •  [COMPLETED] Insert peripheral IV **AND** sodium chloride  •  sodium chloride    Allergies as of 08/16/2022 - Reviewed 08/16/2022   Allergen Reaction Noted   • Penicillins Rash 05/23/2022       Social History     Socioeconomic History   • Marital status: Single   Tobacco Use   • Smoking status: Current Every Day Smoker     Packs/day: 1.00     Types: Cigarettes   • Smokeless tobacco: Never Used   Substance and Sexual Activity   • Alcohol use: Never   • Drug use: Never     Past surgical, medical, social and family history was reviewed, updated and amended when necessary.    Review of Systems   Constitutional: Negative for chills and fever.   HENT: Negative for hoarse voice and sore throat.    Eyes: Negative for double vision and photophobia.   Cardiovascular: Positive for palpitations. Negative for chest pain, leg swelling, near-syncope, orthopnea, paroxysmal nocturnal dyspnea and syncope.   Respiratory: Negative for cough and wheezing.    Skin: Negative for poor wound healing and rash.   Musculoskeletal: Negative for arthritis and joint swelling.   Gastrointestinal: Negative for bloating, abdominal pain, hematemesis and hematochezia.   Neurological: Negative for dizziness and focal weakness.   Psychiatric/Behavioral: Negative for depression and suicidal ideas.            Objective:   Temp:  [97.3 °F (36.3 °C)-98.5 °F (36.9 °C)] 98.3 °F (36.8 °C)  Heart Rate:  [] 137  Resp:  [16-20] 20  BP: (129-167)/() 149/110  Body mass index is 26.12 kg/m².  Flowsheet Rows    Flowsheet Row First Filed Value   Admission Height 175.3 cm (69\") Documented at 08/16/2022 1818   Admission Weight 80.2 kg (176 lb 14.4 oz) " Documented at 08/16/2022 1818        Vitals:    08/17/22 0711   BP: (!) 149/110   Pulse: (!) 137   Resp: 20   Temp: 98.3 °F (36.8 °C)   SpO2: 94%       Vitals reviewed.   Constitutional:       Appearance: Healthy appearance. Not in distress.   Eyes:      Conjunctiva/sclera: Conjunctivae normal.      Pupils: Pupils are equal, round, and reactive to light.   Neck:      Vascular: No JVR. JVD normal.   Pulmonary:      Effort: Pulmonary effort is normal.      Breath sounds: Normal breath sounds. No wheezing. No rhonchi. No rales.   Chest:      Chest wall: Not tender to palpatation.   Cardiovascular:      PMI at left midclavicular line. Tachycardia present. Irregularly irregular rhythm. Normal S1. Normal S2.      Murmurs: There is no murmur.      No gallop. No click. No rub.   Pulses:     Intact distal pulses.   Edema:     Peripheral edema absent.   Abdominal:      General: Bowel sounds are normal.      Palpations: Abdomen is soft.      Tenderness: There is abdominal tenderness.   Musculoskeletal: Normal range of motion.         General: No tenderness. Skin:     General: Skin is warm and dry.   Neurological:      General: No focal deficit present.      Mental Status: Alert and oriented to person, place and time.                 Lab Review:      Results from last 7 days   Lab Units 08/17/22  0451 08/16/22  1142   SODIUM mmol/L 134* 130*   POTASSIUM mmol/L 4.4 4.4   CHLORIDE mmol/L 102 98   CO2 mmol/L 22.0 21.8*   BUN mg/dL 8 12   CREATININE mg/dL 0.51* 0.82   CALCIUM mg/dL 8.4* 9.5   BILIRUBIN mg/dL  --  0.8   ALK PHOS U/L  --  82   ALT (SGPT) U/L  --  15   AST (SGOT) U/L  --  18   GLUCOSE mg/dL 96 104*     Results from last 7 days   Lab Units 08/16/22  2354   TROPONIN T ng/mL <0.010     @[id number]@  Results from last 7 days   Lab Units 08/16/22  2354 08/16/22  1142   WBC 10*3/mm3 12.73* 14.11*   HEMOGLOBIN g/dL 17.0 18.0*   HEMATOCRIT % 48.6 50.2   PLATELETS 10*3/mm3 304 349              @[facility](chol,trig,hdl,ldl)              I personally viewed and interpreted the patient's EKG/Telemetry data  )  Patient Active Problem List   Diagnosis   • Septic bursitis of elbow, right   • Effusion of right elbow   • Cellulitis of right elbow   • Enteritis     Assessment and Plan:    1. Persistent atrial fibrillation -new diagnosis for this gentleman.  Check thyroid levels.  Check echocardiogram.  This is certainly influenced by his alcohol use.  Patient denies snoring or hypersomnia.  Will give a dose of digoxin and start beta-blocker.  We will start anticoagulation for now but his candidacy for long-term anticoagulation is uncertain at this time.  Follow diagnostic testing for further treatment recommendations.  2. Enteritis -severe abdominal pain that is not resolving with pain control.  Defer to observation unit practitioner on need for inpatient admission.  3. Tobacco abuse -counseled on need for cessation and abstinence from nicotine products.  Patient is unwilling to quit at this time.  4. Alcohol abuse -counseled on need for cessation.    Brady Marie MD  08/17/22  08:08 EDT.  Time spent in reviewing chart, discussion and examination:

## 2022-08-17 NOTE — PROGRESS NOTES
MD ATTESTATION NOTE    The GARDENIA and I have discussed this patient's history, physical exam, and treatment plan.  I have reviewed the documentation and personally had a face to face interaction with the patient. I affirm the documentation and agree with the treatment and plan.  The attached note describes my personal findings.      I provided a substantive portion of the care of the patient.  I personally performed the physical exam in its entirety, and below are my findings.  For this patient encounter, the patient wore surgical mask, I wore full protective PPE including N95 and eye protection.      Brief HPI: 57-year-old gentleman that presented to the emergency department with severe abdominal pain along with nausea and vomiting.  In the emergency department he was found to have a segment of severe enteritis which is likely the etiology of his pain.  No surgical pathology at this time, and no evidence of obstruction, but he continues to have quite a bit of pain at this time.  He is no longer vomiting, and he has not had a bowel movement.  Because of the ongoing symptoms he was admitted to the observation unit for further    PHYSICAL EXAM  ED Triage Vitals   Temp Heart Rate Resp BP SpO2   08/16/22 1126 08/16/22 1126 08/16/22 1126 08/16/22 1135 08/16/22 1126   97.3 °F (36.3 °C) (!) 123 16 (!) 155/105 96 %      Temp src Heart Rate Source Patient Position BP Location FiO2 (%)   08/16/22 1126 08/16/22 1126 08/16/22 1818 08/16/22 1818 --   Tympanic Monitor Lying Right arm          GENERAL: Awake and alert, appears uncomfortable  HENT: nares patent  EYES: no scleral icterus  CV: Tachycardic  RESPIRATORY: normal effort  ABDOMEN: Slightly distended with diffuse tenderness but no rebound or guarding.  Bowel sounds are present  MUSCULOSKELETAL: no deformity  NEURO: alert, moves all extremities, follows commands  PSYCH:  calm, cooperative  SKIN: warm, dry    Vital signs and nursing notes reviewed.        Plan: Patient mid to  the observation unit for fairly significant case of enteritis.  He does not have a surgical lesion at this point, but will have to watch him very carefully, provide supportive care along with IV fluids and pain meds.  I think that bowel rest overnight is the best strategy, and surgical consult in the a.m. if unimproved.

## 2022-08-17 NOTE — CASE MANAGEMENT/SOCIAL WORK
Continued Stay Note  [facility]     Patient Name: Robert Hume  MRN: [id number]  Today's Date: 8/17/2022    Admit Date: 8/16/2022     Discharge Plan     Row Name 08/17/22 2353       Plan    Plan Home with friend. Uninsured. Med assist screened and does not qualify for Medicaid. Will have transportation home.    Patient/Family in Agreement with Plan yes    Plan Comments 08/17/22 9360 Received form ED Obs Unit. Pt is uninsured and does not qualify for Medicaid. New Dx of Afib, follow for poss anticoag need. Echo shows EF 36-40%. Pt has no PCP. List of Clinics given to pt. Nickolas Saeed RN-BC               Discharge Codes    No documentation.                     Nickolas Saeed RN

## 2022-08-18 PROCEDURE — 99214 OFFICE O/P EST MOD 30 MIN: CPT | Performed by: INTERNAL MEDICINE

## 2022-08-18 PROCEDURE — 96372 THER/PROPH/DIAG INJ SC/IM: CPT

## 2022-08-18 PROCEDURE — 96376 TX/PRO/DX INJ SAME DRUG ADON: CPT

## 2022-08-18 PROCEDURE — 25010000002 ENOXAPARIN PER 10 MG: Performed by: INTERNAL MEDICINE

## 2022-08-18 PROCEDURE — 25010000002 ONDANSETRON PER 1 MG: Performed by: STUDENT IN AN ORGANIZED HEALTH CARE EDUCATION/TRAINING PROGRAM

## 2022-08-18 PROCEDURE — G0378 HOSPITAL OBSERVATION PER HR: HCPCS

## 2022-08-18 PROCEDURE — 96361 HYDRATE IV INFUSION ADD-ON: CPT

## 2022-08-18 RX ORDER — VALSARTAN 80 MG/1
80 TABLET ORAL
Status: DISCONTINUED | OUTPATIENT
Start: 2022-08-18 | End: 2022-08-19 | Stop reason: HOSPADM

## 2022-08-18 RX ORDER — ASPIRIN 81 MG/1
81 TABLET ORAL DAILY
Status: DISCONTINUED | OUTPATIENT
Start: 2022-08-18 | End: 2022-08-19 | Stop reason: HOSPADM

## 2022-08-18 RX ORDER — ATORVASTATIN CALCIUM 20 MG/1
40 TABLET, FILM COATED ORAL NIGHTLY
Status: DISCONTINUED | OUTPATIENT
Start: 2022-08-18 | End: 2022-08-19 | Stop reason: HOSPADM

## 2022-08-18 RX ADMIN — SODIUM CHLORIDE 75 ML/HR: 9 INJECTION, SOLUTION INTRAVENOUS at 23:06

## 2022-08-18 RX ADMIN — ENOXAPARIN SODIUM 80 MG: 100 INJECTION SUBCUTANEOUS at 22:56

## 2022-08-18 RX ADMIN — ONDANSETRON 4 MG: 2 INJECTION INTRAMUSCULAR; INTRAVENOUS at 03:57

## 2022-08-18 RX ADMIN — PANTOPRAZOLE SODIUM 40 MG: 40 TABLET, DELAYED RELEASE ORAL at 06:10

## 2022-08-18 RX ADMIN — Medication 10 ML: at 08:37

## 2022-08-18 RX ADMIN — ATORVASTATIN CALCIUM 40 MG: 20 TABLET, FILM COATED ORAL at 20:59

## 2022-08-18 RX ADMIN — OXYCODONE AND ACETAMINOPHEN 1 TABLET: 5; 325 TABLET ORAL at 03:57

## 2022-08-18 RX ADMIN — ONDANSETRON 4 MG: 2 INJECTION INTRAMUSCULAR; INTRAVENOUS at 09:51

## 2022-08-18 RX ADMIN — VALSARTAN 80 MG: 80 TABLET, FILM COATED ORAL at 20:59

## 2022-08-18 RX ADMIN — ATENOLOL 25 MG: 25 TABLET ORAL at 08:37

## 2022-08-18 RX ADMIN — ENOXAPARIN SODIUM 80 MG: 100 INJECTION SUBCUTANEOUS at 09:51

## 2022-08-18 RX ADMIN — ASPIRIN 81 MG: 81 TABLET, COATED ORAL at 14:52

## 2022-08-18 RX ADMIN — ONDANSETRON 4 MG: 2 INJECTION INTRAMUSCULAR; INTRAVENOUS at 18:07

## 2022-08-18 RX ADMIN — ATENOLOL 25 MG: 25 TABLET ORAL at 22:56

## 2022-08-18 RX ADMIN — Medication 10 ML: at 20:59

## 2022-08-18 NOTE — PLAN OF CARE
Goal Outcome Evaluation:           Progress: no change  Outcome Evaluation: VSS, no c/o pain, c/o nausea PRN given, IVFs, tolerating diet, no BM thus far, + voiding

## 2022-08-18 NOTE — CONSULTS
I was requested to see patient regarding an Advance Directive.  Patient was not feeling well, so I briefly explained the AD, and left him the form to read.  I shared that when he is up to it, I will be glad to come back to complete the document if he desires.

## 2022-08-18 NOTE — PROGRESS NOTES
"[facility]    Patient Name: Robert Hume  :1965  57 y.o.  LOS: 0  Encounter Provider: Brady Marie Jr, MD      Patient Care Team:  Provider, No Known as PCP - General    Chief Complaint: Follow-up enteritis, paroxysmal atrial fibrillation, tobacco abuse, alcohol abuse, acute systolic heart failure    Interval History: Echocardiogram reviewed.  Acute systolic heart failure tachycardia mediated versus ischemic heart disease.  Patient has no angina.  Patient is converted to sinus rhythm at midnight last night.  No bleeding currently on Lovenox.       Objective   Vital Signs  Temp:  [97.7 °F (36.5 °C)-98.6 °F (37 °C)] 98.5 °F (36.9 °C)  Heart Rate:  [] 71  Resp:  [16-18] 18  BP: (111-170)/(79-96) 170/96    Intake/Output Summary (Last 24 hours) at 2022 1808  Last data filed at 2022 1800  Gross per 24 hour   Intake 120 ml   Output 2400 ml   Net -2280 ml     Flowsheet Rows    Flowsheet Row First Filed Value   Admission Height 175.3 cm (69\") Documented at 2022 1818   Admission Weight 80.2 kg (176 lb 14.4 oz) Documented at 2022 1818            Physical Exam    Vitals reviewed.   Constitutional:       Appearance: Healthy appearance. Not in distress.   Eyes:      Conjunctiva/sclera: Conjunctivae normal.      Pupils: Pupils are equal, round, and reactive to light.   Neck:      Vascular: No JVR. JVD normal.   Pulmonary:      Effort: Pulmonary effort is normal.      Breath sounds: Normal breath sounds. No wheezing. No rhonchi. No rales.   Chest:      Chest wall: Not tender to palpatation.   Cardiovascular:      PMI at left midclavicular line. Tachycardia present. Irregularly irregular rhythm. Normal S1. Normal S2.      Murmurs: There is no murmur.      No gallop. No click. No rub.   Pulses:     Intact distal pulses.   Edema:     Peripheral edema absent.   Abdominal:      General: Bowel sounds are normal.      Palpations: Abdomen is soft.      Tenderness: There is " abdominal tenderness.   Musculoskeletal: Normal range of motion.         General: No tenderness. Skin:     General: Skin is warm and dry.   Neurological:      General: No focal deficit present.      Mental Status: Alert and oriented to person, place and time.      Physical exam was reviewed, updated and amended when necessary.    Pertinent Test Results:  Results from last 7 days   Lab Units 08/17/22  0451 08/16/22  1142   SODIUM mmol/L 134* 130*   POTASSIUM mmol/L 4.4 4.4   CHLORIDE mmol/L 102 98   CO2 mmol/L 22.0 21.8*   BUN mg/dL 8 12   CREATININE mg/dL 0.51* 0.82   GLUCOSE mg/dL 96 104*   CALCIUM mg/dL 8.4* 9.5   AST (SGOT) U/L  --  18   ALT (SGPT) U/L  --  15     Results from last 7 days   Lab Units 08/16/22  2354   TROPONIN T ng/mL <0.010     Results from last 7 days   Lab Units 08/16/22  2354 08/16/22  1142   WBC 10*3/mm3 12.73* 14.11*   HEMOGLOBIN g/dL 17.0 18.0*   HEMATOCRIT % 48.6 50.2   PLATELETS 10*3/mm3 304 349         Results from last 7 days   Lab Units 08/17/22  1101   MAGNESIUM mg/dL 1.4*           Invalid input(s): LDLCALC      Results from last 7 days   Lab Units 08/17/22  0451   TSH uIU/mL 2.250           Medication Review:   aspirin, 81 mg, Oral, Daily  atenolol, 25 mg, Oral, Q12H  atorvastatin, 40 mg, Oral, Nightly  enoxaparin, 1 mg/kg, Subcutaneous, Q12H  pantoprazole, 40 mg, Oral, Q AM  sodium chloride, 10 mL, Intravenous, Q12H         sodium chloride, 75 mL/hr, Last Rate: 75 mL/hr (08/18/22 1709)        Assessment & Plan   1. Persistent atrial fibrillation -new diagnosis for this gentleman.  Normal thyroid levels.  Echo with EF 35-40% possibly secondary to tachycardia. Will ultimately need stress study but this can be done as an outpt or prior to DC once GI issues resolved. Alcohol cessation counseled.  Patient is cardioverted to sinus rhythm.  We will continue beta-blocker and Lovenox for now until we are certain no further procedures or interventions are needed.  2. Acute systolic heart  failure -patient will ultimately need ischemic work-up either as an outpatient or prior to discharge.  We will switch over to long-acting metoprolol prior to discharge and add valsartan.  3. Enteritis -severe abdominal pain that is not resolving with pain control.    CT abdomen shows enteritis.  Defer further work-up and management to internal medicine.  4. Tobacco abuse -counseled on need for cessation and abstinence from nicotine products.  Patient is unwilling to quit at this time.  5. Alcohol abuse -counseled on need for cessation.      Brady Marie Jr, MD  [facility]  08/18/22  18:08 EDT

## 2022-08-18 NOTE — PLAN OF CARE
Goal Outcome Evaluation:  Plan of Care Reviewed With: patient        Progress: improving  Outcome Evaluation: VSS. Scheduled meds given as ordered. Pt converted to NSR/SB around 2350 this shift. HR remains between 40s-60s. Pt complaining of abd pain - relieved with PRN oxycodone. NS continued @ 75mL/hr. Tolerating full liquid diet - PN zofran requestd x1 with relief. Assistance when OOB provided. Unable to obtain stool sample this shift. No other changes. WCM

## 2022-08-19 ENCOUNTER — READMISSION MANAGEMENT (OUTPATIENT)
Dept: CALL CENTER | Facility: HOSPITAL | Age: 57
End: 2022-08-19

## 2022-08-19 VITALS
WEIGHT: 176 LBS | TEMPERATURE: 98 F | BODY MASS INDEX: 26.07 KG/M2 | RESPIRATION RATE: 17 BRPM | DIASTOLIC BLOOD PRESSURE: 97 MMHG | HEART RATE: 68 BPM | SYSTOLIC BLOOD PRESSURE: 169 MMHG | HEIGHT: 69 IN | OXYGEN SATURATION: 93 %

## 2022-08-19 PROBLEM — F10.10 ETOH ABUSE: Status: ACTIVE | Noted: 2022-08-19

## 2022-08-19 PROBLEM — E87.1 HYPONATREMIA: Status: RESOLVED | Noted: 2022-08-17 | Resolved: 2022-08-19

## 2022-08-19 PROBLEM — K52.9 ENTERITIS: Status: RESOLVED | Noted: 2022-08-16 | Resolved: 2022-08-19

## 2022-08-19 PROBLEM — D72.829 LEUKOCYTOSIS: Status: RESOLVED | Noted: 2022-08-17 | Resolved: 2022-08-19

## 2022-08-19 PROBLEM — I48.19 ATRIAL FIBRILLATION, PERSISTENT: Status: ACTIVE | Noted: 2022-08-17

## 2022-08-19 PROBLEM — Z72.0 TOBACCO ABUSE: Status: ACTIVE | Noted: 2022-08-19

## 2022-08-19 PROBLEM — I50.21 ACUTE SYSTOLIC HEART FAILURE: Status: ACTIVE | Noted: 2022-08-19

## 2022-08-19 LAB
ALBUMIN SERPL-MCNC: 3.6 G/DL (ref 3.5–5.2)
ALBUMIN/GLOB SERPL: 1.6 G/DL
ALP SERPL-CCNC: 64 U/L (ref 39–117)
ALT SERPL W P-5'-P-CCNC: 11 U/L (ref 1–41)
ANION GAP SERPL CALCULATED.3IONS-SCNC: 9 MMOL/L (ref 5–15)
AST SERPL-CCNC: 16 U/L (ref 1–40)
BILIRUB SERPL-MCNC: 0.6 MG/DL (ref 0–1.2)
BUN SERPL-MCNC: 4 MG/DL (ref 6–20)
BUN/CREAT SERPL: 8.2 (ref 7–25)
CALCIUM SPEC-SCNC: 9.5 MG/DL (ref 8.6–10.5)
CHLORIDE SERPL-SCNC: 103 MMOL/L (ref 98–107)
CO2 SERPL-SCNC: 25 MMOL/L (ref 22–29)
CREAT SERPL-MCNC: 0.49 MG/DL (ref 0.76–1.27)
DEPRECATED RDW RBC AUTO: 45.8 FL (ref 37–54)
EGFRCR SERPLBLD CKD-EPI 2021: 119 ML/MIN/1.73
ERYTHROCYTE [DISTWIDTH] IN BLOOD BY AUTOMATED COUNT: 12.7 % (ref 12.3–15.4)
FOLATE SERPL-MCNC: 11.8 NG/ML (ref 4.78–24.2)
GLOBULIN UR ELPH-MCNC: 2.3 GM/DL
GLUCOSE SERPL-MCNC: 101 MG/DL (ref 65–99)
HCT VFR BLD AUTO: 43.5 % (ref 37.5–51)
HGB BLD-MCNC: 14.8 G/DL (ref 13–17.7)
INR PPP: 1.15 (ref 0.9–1.1)
MCH RBC QN AUTO: 33.5 PG (ref 26.6–33)
MCHC RBC AUTO-ENTMCNC: 34 G/DL (ref 31.5–35.7)
MCV RBC AUTO: 98.4 FL (ref 79–97)
PLATELET # BLD AUTO: 264 10*3/MM3 (ref 140–450)
PMV BLD AUTO: 10.3 FL (ref 6–12)
POTASSIUM SERPL-SCNC: 3.9 MMOL/L (ref 3.5–5.2)
PROT SERPL-MCNC: 5.9 G/DL (ref 6–8.5)
PROTHROMBIN TIME: 14.6 SECONDS (ref 11.7–14.2)
RBC # BLD AUTO: 4.42 10*6/MM3 (ref 4.14–5.8)
SODIUM SERPL-SCNC: 137 MMOL/L (ref 136–145)
VIT B12 BLD-MCNC: 258 PG/ML (ref 211–946)
WBC NRBC COR # BLD: 7.08 10*3/MM3 (ref 3.4–10.8)

## 2022-08-19 PROCEDURE — 96372 THER/PROPH/DIAG INJ SC/IM: CPT

## 2022-08-19 PROCEDURE — 85027 COMPLETE CBC AUTOMATED: CPT | Performed by: NURSE PRACTITIONER

## 2022-08-19 PROCEDURE — 25010000002 ENOXAPARIN PER 10 MG: Performed by: INTERNAL MEDICINE

## 2022-08-19 PROCEDURE — 85610 PROTHROMBIN TIME: CPT | Performed by: NURSE PRACTITIONER

## 2022-08-19 PROCEDURE — 96361 HYDRATE IV INFUSION ADD-ON: CPT

## 2022-08-19 PROCEDURE — 99214 OFFICE O/P EST MOD 30 MIN: CPT | Performed by: NURSE PRACTITIONER

## 2022-08-19 PROCEDURE — 80053 COMPREHEN METABOLIC PANEL: CPT | Performed by: NURSE PRACTITIONER

## 2022-08-19 PROCEDURE — G0378 HOSPITAL OBSERVATION PER HR: HCPCS

## 2022-08-19 RX ORDER — PANTOPRAZOLE SODIUM 40 MG/1
40 TABLET, DELAYED RELEASE ORAL
Status: DISCONTINUED | OUTPATIENT
Start: 2022-08-19 | End: 2022-08-19 | Stop reason: HOSPADM

## 2022-08-19 RX ORDER — PANTOPRAZOLE SODIUM 40 MG/1
40 TABLET, DELAYED RELEASE ORAL
Qty: 60 TABLET | Refills: 0 | Status: SHIPPED | OUTPATIENT
Start: 2022-08-19

## 2022-08-19 RX ORDER — SUCRALFATE 1 G/1
1 TABLET ORAL
Status: DISCONTINUED | OUTPATIENT
Start: 2022-08-19 | End: 2022-08-19 | Stop reason: HOSPADM

## 2022-08-19 RX ORDER — VALSARTAN 80 MG/1
80 TABLET ORAL
Qty: 30 TABLET | Refills: 0 | Status: SHIPPED | OUTPATIENT
Start: 2022-08-20

## 2022-08-19 RX ORDER — SUCRALFATE 1 G/1
1 TABLET ORAL
Qty: 56 TABLET | Refills: 0 | Status: SHIPPED | OUTPATIENT
Start: 2022-08-19 | End: 2022-09-02

## 2022-08-19 RX ORDER — ASPIRIN 81 MG/1
81 TABLET ORAL DAILY
Qty: 30 TABLET | Refills: 0 | Status: SHIPPED | OUTPATIENT
Start: 2022-08-20

## 2022-08-19 RX ORDER — ATORVASTATIN CALCIUM 40 MG/1
40 TABLET, FILM COATED ORAL NIGHTLY
Qty: 90 TABLET | Refills: 0 | Status: SHIPPED | OUTPATIENT
Start: 2022-08-19

## 2022-08-19 RX ORDER — CARVEDILOL 6.25 MG/1
6.25 TABLET ORAL 2 TIMES DAILY WITH MEALS
Status: DISCONTINUED | OUTPATIENT
Start: 2022-08-19 | End: 2022-08-19 | Stop reason: HOSPADM

## 2022-08-19 RX ORDER — CARVEDILOL 6.25 MG/1
6.25 TABLET ORAL 2 TIMES DAILY WITH MEALS
Qty: 60 TABLET | Refills: 0 | Status: SHIPPED | OUTPATIENT
Start: 2022-08-19

## 2022-08-19 RX ADMIN — PANTOPRAZOLE SODIUM 40 MG: 40 TABLET, DELAYED RELEASE ORAL at 06:45

## 2022-08-19 RX ADMIN — SUCRALFATE 1 G: 1 TABLET ORAL at 11:44

## 2022-08-19 RX ADMIN — ATENOLOL 25 MG: 25 TABLET ORAL at 09:18

## 2022-08-19 RX ADMIN — VALSARTAN 80 MG: 80 TABLET, FILM COATED ORAL at 09:18

## 2022-08-19 RX ADMIN — ASPIRIN 81 MG: 81 TABLET, COATED ORAL at 09:18

## 2022-08-19 RX ADMIN — ENOXAPARIN SODIUM 80 MG: 100 INJECTION SUBCUTANEOUS at 09:19

## 2022-08-19 RX ADMIN — Medication 10 ML: at 09:19

## 2022-08-19 NOTE — PROGRESS NOTES
"    Patient Name: Robert Hume  :1965  57 y.o.      Patient Care Team:  Provider, No Known as PCP - General    Chief Complaint: follow up new cardiomyopathy, AF    Interval History: in SR. He feels well. No CP or SOA.        Objective   Vital Signs  Temp:  [98 °F (36.7 °C)-99 °F (37.2 °C)] 98 °F (36.7 °C)  Heart Rate:  [63-76] 68  Resp:  [17-18] 17  BP: (155-170)/(89-97) 169/97    Intake/Output Summary (Last 24 hours) at 2022 1225  Last data filed at 2022 1132  Gross per 24 hour   Intake --   Output 5775 ml   Net -5775 ml     Flowsheet Rows    Flowsheet Row First Filed Value   Admission Height 175.3 cm (69\") Documented at 2022   Admission Weight 80.2 kg (176 lb 14.4 oz) Documented at 2022          Physical Exam:   General Appearance:    Alert, cooperative, in no acute distress   Lungs:     Clear to auscultation.  Normal respiratory effort and rate.      Heart:    Regular rhythm and normal rate, normal S1 and S2, no murmurs, gallops or rubs.     Chest Wall:    No abnormalities observed   Abdomen:     Soft, nontender, positive bowel sounds.     Extremities:   no cyanosis, clubbing or edema.  No marked joint deformities.  Adequate musculoskeletal strength.       Results Review:    Results from last 7 days   Lab Units 22  1013   SODIUM mmol/L 137   POTASSIUM mmol/L 3.9   CHLORIDE mmol/L 103   CO2 mmol/L 25.0   BUN mg/dL 4*   CREATININE mg/dL 0.49*   GLUCOSE mg/dL 101*   CALCIUM mg/dL 9.5     Results from last 7 days   Lab Units 22  2354   TROPONIN T ng/mL <0.010     Results from last 7 days   Lab Units 22  1013   WBC 10*3/mm3 7.08   HEMOGLOBIN g/dL 14.8   HEMATOCRIT % 43.5   PLATELETS 10*3/mm3 264     Results from last 7 days   Lab Units 22  1013   INR  1.15*     Results from last 7 days   Lab Units 22  1101   MAGNESIUM mg/dL 1.4*                   Medication Review:   aspirin, 81 mg, Oral, Daily  atorvastatin, 40 mg, Oral, Nightly  carvedilol, 6.25 " mg, Oral, BID With Meals  enoxaparin, 1 mg/kg, Subcutaneous, Q12H  pantoprazole, 40 mg, Oral, BID AC  sodium chloride, 10 mL, Intravenous, Q12H  sucralfate, 1 g, Oral, 4x Daily AC & at Bedtime  valsartan, 80 mg, Oral, Q24H              Assessment & Plan   1. Paroxysmal atrial fibrillation - new. Back in SR. Continue beta blocker and lovenox. CHADs2 Vasc score of 2. Plan for apixaban 5 mg BID at discharge.     2. HFrEF - new. Euvolemic. Change to carvedilol. Continue valsartan. Needs ischemic eval. Could do Monday if he is still here. If he can go home over the weekend, we will arrange outpatient follow up.     3. Enteritis - improving.     4. Tobacco abuse    5. Alcohol abuse     Carvedilol and valsartan. Apixaban at discharge.   Will see again Monday if he is still here. No objection to dc over weekend. Will arrange close outpatient follow up.     PRISCILA Gonzalez  [facility]  08/19/22  12:25 EDT

## 2022-08-19 NOTE — PLAN OF CARE
Goal Outcome Evaluation:      Pt meets criteria for discharge, being discharged home. IV and heart monitor removed. Discharge instructions provided. Discharge meds dropped off with pt.

## 2022-08-19 NOTE — PLAN OF CARE
Goal Outcome Evaluation:  Plan of Care Reviewed With: patient        Progress: improving  Outcome Evaluation: VSS. No new complaints this shift - no PRN meds needed. NS continued @ 75mL/hr. NSR/SB continued this shift. Lipitor and valsartan given as ordered. Urinal at bedside. No other changes this shift. WCM

## 2022-08-19 NOTE — CASE MANAGEMENT/SOCIAL WORK
Case Management Discharge Note      Final Note: Home with friend. Uninsured. Med assist screened and does not qualify for Medicaid. Started on Eliquis with first month [facility]. All meds filled through meds to bed. Transport by private auto    Provided Post Acute Provider List?: N/A  Provided Post Acute Provider Quality & Resource List?: N/A    Selected Continued Care - Discharged on 8/19/2022 Admission date: 8/16/2022 - Discharge disposition: Home or Self Care    Destination    No services have been selected for the patient.              Durable Medical Equipment    No services have been selected for the patient.              Dialysis/Infusion    No services have been selected for the patient.              Home Medical Care    No services have been selected for the patient.              Therapy    No services have been selected for the patient.              Community Resources    No services have been selected for the patient.              Community & DME    No services have been selected for the patient.                  Transportation Services  Private: Car    Final Discharge Disposition Code: 01 - home or self-care

## 2022-08-19 NOTE — DISCHARGE INSTRUCTIONS
Stop smoking and drinking alcohol.  Continue Protonix daily and Carafate x1 month.  Follow-up with cardiology as directed.  Follow-up with gastroenterology in the outpatient setting for upper and lower endoscopy to monitor for inflammation of upper GI tract and for colon cancer screening.

## 2022-08-19 NOTE — PROGRESS NOTES
Name: Robert Hume ADMIT: 2022   : 1965  PCP: Provider, No Known    MRN: [id number] LOS: 0 days   AGE/SEX: 57 y.o. male  ROOM: [facility]/     Subjective   Subjective    still with abdominal pain worse in the upper abdomen.  No nausea or vomiting.  Tolerating full liquids.  No fever, chills, chest pain shortness of breath.    Review of Systems see above     Objective   Objective   Vital Signs  Temp:  [98 °F (36.7 °C)-99 °F (37.2 °C)] 98 °F (36.7 °C)  Heart Rate:  [63-76] 63  Resp:  [17-18] 17  BP: (155-170)/(89-97) 169/97  SpO2:  [93 %-94 %] 93 %  on   ;   Device (Oxygen Therapy): room air  Body mass index is 25.99 kg/m².  Physical Exam  Vitals reviewed.   Constitutional:       Appearance: He is well-developed.      Comments: Chronically ill-appearing   HENT:      Head: Normocephalic and atraumatic.   Cardiovascular:      Rate and Rhythm: Tachycardia present. Rhythm irregular.   Pulmonary:      Effort: Pulmonary effort is normal. No respiratory distress.      Breath sounds: Normal breath sounds.   Abdominal:      General: Bowel sounds are normal. There is no distension.      Palpations: Abdomen is soft. There is no mass.      Tenderness: There is abdominal tenderness. There is no guarding.      Hernia: No hernia is present.   Skin:     General: Skin is warm and dry.   Neurological:      General: No focal deficit present.      Mental Status: He is alert and oriented to person, place, and time.      Comments: No obvious signs of EtOH withdrawal   Psychiatric:         Mood and Affect: Mood normal.         Behavior: Behavior normal.         Thought Content: Thought content normal.         Results Review     I reviewed the patient's new clinical results.  Results from last 7 days   Lab Units 22  2354 22  1142   WBC 10*3/mm3 12.73* 14.11*   HEMOGLOBIN g/dL 17.0 18.0*   PLATELETS 10*3/mm3 304 349     Results from last 7 days   Lab Units 22  0451 22  1142   SODIUM mmol/L 134* 130*    POTASSIUM mmol/L 4.4 4.4   CHLORIDE mmol/L 102 98   CO2 mmol/L 22.0 21.8*   BUN mg/dL 8 12   CREATININE mg/dL 0.51* 0.82   GLUCOSE mg/dL 96 104*   EGFR mL/min/1.73 117.5 101.8     Results from last 7 days   Lab Units 08/16/22  1142   ALBUMIN g/dL 4.00   BILIRUBIN mg/dL 0.8   ALK PHOS U/L 82   AST (SGOT) U/L 18   ALT (SGPT) U/L 15     Results from last 7 days   Lab Units 08/17/22  1101 08/17/22  0451 08/16/22  1142   CALCIUM mg/dL  --  8.4* 9.5   ALBUMIN g/dL  --   --  4.00   MAGNESIUM mg/dL 1.4*  --   --    PHOSPHORUS mg/dL  --  2.8  --      Results from last 7 days   Lab Units 08/16/22  2354   LACTATE mmol/L 0.8     No results found for: HGBA1C, POCGLU    No radiology results for the last day  Scheduled Medications  aspirin, 81 mg, Oral, Daily  atenolol, 25 mg, Oral, Q12H  atorvastatin, 40 mg, Oral, Nightly  enoxaparin, 1 mg/kg, Subcutaneous, Q12H  pantoprazole, 40 mg, Oral, BID AC  sodium chloride, 10 mL, Intravenous, Q12H  sucralfate, 1 g, Oral, 4x Daily AC & at Bedtime  valsartan, 80 mg, Oral, Q24H    Infusions  sodium chloride, 75 mL/hr, Last Rate: 75 mL/hr (08/19/22 0520)    Diet  Diet Full Liquid       Assessment/Plan     Active Hospital Problems    Diagnosis  POA   • ETOH abuse [F10.10]  Yes   • Tobacco abuse [Z72.0]  Yes   • Atrial fibrillation (HCC) [I48.91]  Yes   • Hyponatremia [E87.1]  Yes   • Leukocytosis [D72.829]  Yes   • Enteritis [K52.9]  Yes      Resolved Hospital Problems   No resolved problems to display.       57 y.o. male admitted with PAF, acute systolic CHF, enteritis with ETOH abuse    Enteritis  Per CT scan.  In the setting of EtOH and tobacco abuse.  No BM for stool studies.  Suspect element of alcohol induced inflammation.  increase PPI to twice daily and add Carafate.  Judicious use of narcotics given addiction history.  Advance diet slowly as tolerated.  Supportive care.  If no improvement consider GI consultation.     Persistent atrial fibrillation/acute systolic CHF  Prescient  cardiology assistance.  EF 35 to 40%.  The blocker and Lovenox.  Valsartan added along with metoprolol.     Tobacco abuse and alcohol abuse  LFTs normal, small amount of ascites on CT scan.  Check INR.  Ultrasound liver with CT scan demonstrating steatosis and small hypodensity right hepatic lobe.     · Lovenox for DVT prophylaxis.  · Full code.  · Discussed with patient, nursing staff and Dr. Shirley.  · Anticipate discharge TBD      PRISCILA Gaspar  [facility]  8/19/22  Patient seen 8/18/22 but note entered 8/19/22

## 2022-08-20 NOTE — OUTREACH NOTE
Prep Survey    Flowsheet Row Responses   [facility] facility patient discharged from? [place]   Is LACE score < 7 ? Yes   Emergency Room discharge w/ pulse ox? No   Eligibility Not Eligible   What are the reasons patient is not eligible? Other  [ETOH abuse]   Does the patient have one of the following disease processes/diagnoses(primary or secondary)? Other   Prep survey completed? Yes          JUAN ANTONIO MCHUGH - Registered Nurse

## 2022-08-23 NOTE — DISCHARGE SUMMARY
Patient Name: Robert Hume  : 1965  MRN: [id number]    Date of Admission: 2022  Date of Discharge:  2022  Primary Care Physician: Provider, No Known      Chief Complaint:   Abdominal Pain      Discharge Diagnoses     Active Hospital Problems    Diagnosis  POA   • ETOH abuse [F10.10]  Yes   • Tobacco abuse [Z72.0]  Yes   • Acute systolic heart failure (HCC) [I50.21]  Yes   • Atrial fibrillation, persistent (HCC) [I48.19]  Yes      Resolved Hospital Problems    Diagnosis Date Resolved POA   • Hyponatremia [E87.1] 2022 Yes   • Leukocytosis [D72.829] 2022 Yes   • Enteritis [K52.9] 2022 Yes        Hospital Course     Mr. Hume is a 57 y.o. male admitted with EtOH abuse and acute enteritis.  Also with new onset acute systolic CHF and PAF.  Enteritis demonstrated on CT scan with likely element of alcohol induced inflammation as well.  He was started on PPI twice daily and Carafate.  He improved slowly with supportive care.  Cardiology was also consulted for persistent atrial fibrillation and new systolic CHF.  He was started on valsartan, metoprolol.  EF was 35 to 40%.  Patient improved with supportive care and discharged to home.        Day of Discharge     Subjective:  Feels okay no new complaints.  Denies abdominal pain nausea vomiting tolerating diet.  No fever or chills.  No chest pain or shortness of breath.    Physical Exam:     Body mass index is 25.99 kg/m².  Physical Exam  Vitals reviewed.   Constitutional:       Appearance: Normal appearance. He is well-developed.   HENT:      Head: Normocephalic and atraumatic.   Cardiovascular:      Rate and Rhythm: Normal rate and regular rhythm.   Pulmonary:      Effort: Pulmonary effort is normal. No respiratory distress.      Breath sounds: Normal breath sounds.   Abdominal:      General: Bowel sounds are normal. There is no distension.      Palpations: Abdomen is soft. There is no mass.      Tenderness: There is no abdominal  tenderness.      Hernia: No hernia is present.   Skin:     General: Skin is warm and dry.   Neurological:      Mental Status: He is alert and oriented to person, place, and time.   Psychiatric:         Behavior: Behavior normal.         Thought Content: Thought content normal.         Judgment: Judgment normal.         Consultants     Consult Orders (all) (From admission, onward)     Start     Ordered    08/17/22 0838  Inpatient Hospitalist Consult  Once        Specialty:  Hospitalist  Provider:  Lexi Young MD    08/17/22 0838    08/17/22 0600  Inpatient Cardiology Consult  IN AM        Specialty:  Cardiology  Provider:  Jonathon Klein MD    08/17/22 0510    08/16/22 1823  Inpatient Consult to Advance Care Planning  Once        Provider:  (Not yet assigned)    08/16/22 1826              Procedures     * Surgery not found *      Imaging Results (All)     Procedure Component Value Units Date/Time    CT Abdomen Pelvis With Contrast [[id number]] Collected: 08/16/22 1413     Updated: 08/16/22 1428    Narrative:      CT ABDOMEN AND PELVIS WITH IV CONTRAST     HISTORY: Nausea, vomiting, diarrhea.     TECHNIQUE: Radiation dose reduction techniques were utilized, including  automated exposure control and exposure modulation based on body size.   3 mm images were obtained through the abdomen and pelvis after the  administration of IV contrast.      COMPARISON: None     FINDINGS:  Lower chest: Patchy ground-glass opacities in the lung bases, right  greater than left favoring atelectasis and/or scarring, please correlate  clinically and follow-up as indicated.     The heart size is within normal limits.     Liver: Focal fat infiltration. Mild lobular contour. Too small to  characterize sub-5 mm hypodensity in the right hepatic lobe.     Biliary tract: Within normal limits.     Spleen: Calcified granulomas.     Pancreas: Within normal limits.     Adrenals: Within normal limits.     Kidneys:  Within normal  limits.     Bowel:  The stomach is incompletely distended. There is a long segment  of bowel wall thickening and mucosal hyperenhancement involving the mid  to distal jejunum to the terminal ileum. The appendix is within normal  limits. The colon is incompletely distended. No free air or obvious  pneumatosis. Small volume ascites.     Vasculature:    Extensive atherosclerosis abdominal aorta and branch  vessels. The study is not optimized for vascular evaluation however,  normal enhancement is present centrally.     Lymph Nodes:  Within normal limits.                                                            Pelvic organs: The bladder is incompletely distended. Prostate in situ.     Abdominal/Pelvic Wall: Within normal limits.     Bones: Multilevel degenerative changes thoracolumbar spine. Arthropathic  changes hips, right greater than left.       Impression:      . IMPRESSION:  1.  Moderate-to-severe enteritis extending from the mid jejunum to the  terminal ileum (likely infectious or inflammatory) with small volume  ascites. Recommend clinical correlation and attention on short-term  follow-up after treatment.  2.   Extensive atherosclerosis thoracoabdominal aorta and branch  vessels.  3.  Bibasilar airspace disease favoring atelectasis and/or scar which  can be followed on subsequent surveillance imaging.  4.  Please see above for additional findings.     I discussed these findings with Dr. Adrienne Nguyen at 1:24 PM on 08/16/2022.           This report was finalized on 8/16/2022 2:24 PM by Dr. Juan R Monson M.D.             Results for orders placed during the hospital encounter of 08/16/22    Adult Transthoracic Echo Complete W/ Cont if Necessary Per Protocol    Interpretation Summary  · Left ventricular wall thickness is consistent with hypertrophy. Sigmoid-shaped ventricular septum is present.  · Left ventricular ejection fraction appears to be 36 - 40%.  · Left ventricular diastolic function was indeterminate.  ·  The left atrial cavity is mildly dilated.    Pertinent Labs     Results from last 7 days   Lab Units 08/19/22  1013 08/16/22  2354   WBC 10*3/mm3 7.08 12.73*   HEMOGLOBIN g/dL 14.8 17.0   PLATELETS 10*3/mm3 264 304     Results from last 7 days   Lab Units 08/19/22  1013 08/17/22  0451   SODIUM mmol/L 137 134*   POTASSIUM mmol/L 3.9 4.4   CHLORIDE mmol/L 103 102   CO2 mmol/L 25.0 22.0   BUN mg/dL 4* 8   CREATININE mg/dL 0.49* 0.51*   GLUCOSE mg/dL 101* 96   EGFR mL/min/1.73 119.0 117.5     Results from last 7 days   Lab Units 08/19/22  1013   ALBUMIN g/dL 3.60   BILIRUBIN mg/dL 0.6   ALK PHOS U/L 64   AST (SGOT) U/L 16   ALT (SGPT) U/L 11     Results from last 7 days   Lab Units 08/19/22  1013 08/17/22  1101 08/17/22  0451   CALCIUM mg/dL 9.5  --  8.4*   ALBUMIN g/dL 3.60  --   --    MAGNESIUM mg/dL  --  1.4*  --    PHOSPHORUS mg/dL  --   --  2.8       Results from last 7 days   Lab Units 08/16/22  2354   TROPONIN T ng/mL <0.010           Invalid input(s): LDLCALC          Test Results Pending at Discharge       Discharge Details        Discharge Medications      New Medications      Instructions Start Date   Aspirin Adult Low Strength 81 MG EC tablet  Generic drug: aspirin   81 mg, Oral, Daily      atorvastatin 40 MG tablet  Commonly known as: LIPITOR   40 mg, Oral, Nightly      carvedilol 6.25 MG tablet  Commonly known as: COREG   6.25 mg, Oral, 2 Times Daily With Meals      Eliquis 5 MG tablet tablet  Generic drug: apixaban   5 mg, Oral, 2 Times Daily      pantoprazole 40 MG EC tablet  Commonly known as: PROTONIX   40 mg, Oral, 2 Times Daily Before Meals      sucralfate 1 g tablet  Commonly known as: CARAFATE   Take 1 tablet by mouth 4 (Four) Times a Day Before Meals & at Bedtime for 14 days. Crush tablet and mix with 20 mL of water and take as a slurry      valsartan 80 MG tablet  Commonly known as: DIOVAN   80 mg, Oral, Every 24 Hours Scheduled             Allergies   Allergen Reactions   • Penicillins Rash        Discharge Disposition:  Home or Self Care      Discharge Diet:  No active diet order      Discharge Activity:       CODE STATUS:    Code Status and Medical Interventions:   Ordered at: 08/16/22 1758     Code Status (Patient has no pulse and is not breathing):    CPR (Attempt to Resuscitate)     Medical Interventions (Patient has pulse or is breathing):    Full Support       No future appointments.   Follow-up Information     Provider, No Known .    Contact information:  [facility] [id number] [phone]             Ricky London MD Follow up.    Specialty: Gastroenterology  Contact information:  [address]  [address]  [place] [id number] [phone]             Brady Marie Jr., MD Follow up.    Specialties: Cardiology, Interventional Cardiology  Contact information:  [address] [facility]  [address]  [place] [address] [phone]                         Time Spent on Discharge:  Greater than 30 minutes      PRISCILA Gaspar  [facility]  8/19/22